# Patient Record
Sex: FEMALE | Race: WHITE | Employment: FULL TIME | URBAN - METROPOLITAN AREA
[De-identification: names, ages, dates, MRNs, and addresses within clinical notes are randomized per-mention and may not be internally consistent; named-entity substitution may affect disease eponyms.]

---

## 2020-05-05 ENCOUNTER — VIRTUAL VISIT (OUTPATIENT)
Dept: FAMILY MEDICINE CLINIC | Age: 30
End: 2020-05-05
Payer: COMMERCIAL

## 2020-05-05 ENCOUNTER — TELEPHONE (OUTPATIENT)
Dept: FAMILY MEDICINE CLINIC | Age: 30
End: 2020-05-05

## 2020-05-05 ENCOUNTER — TELEPHONE (OUTPATIENT)
Dept: CARDIOLOGY CLINIC | Age: 30
End: 2020-05-05

## 2020-05-05 VITALS — BODY MASS INDEX: 29.16 KG/M2 | WEIGHT: 175 LBS | HEIGHT: 65 IN

## 2020-05-05 PROCEDURE — 99203 OFFICE O/P NEW LOW 30 MIN: CPT | Performed by: FAMILY MEDICINE

## 2020-05-05 PROCEDURE — G8427 DOCREV CUR MEDS BY ELIG CLIN: HCPCS | Performed by: FAMILY MEDICINE

## 2020-05-05 RX ORDER — LULICONAZOLE 10 MG/G
1 CREAM TOPICAL 2 TIMES DAILY
Qty: 1 TUBE | Refills: 1 | Status: SHIPPED | OUTPATIENT
Start: 2020-05-05 | End: 2020-09-03

## 2020-05-05 RX ORDER — ALBUTEROL SULFATE 90 UG/1
2 AEROSOL, METERED RESPIRATORY (INHALATION) EVERY 6 HOURS PRN
COMMUNITY
Start: 2020-05-01

## 2020-05-05 RX ORDER — CYANOCOBALAMIN 1000 UG/ML
1000 INJECTION INTRAMUSCULAR; SUBCUTANEOUS
COMMUNITY
End: 2020-05-05 | Stop reason: SDUPTHER

## 2020-05-05 RX ORDER — CYANOCOBALAMIN 1000 UG/ML
1000 INJECTION INTRAMUSCULAR; SUBCUTANEOUS
Qty: 1 VIAL | Refills: 5 | Status: SHIPPED | OUTPATIENT
Start: 2020-05-05 | End: 2020-05-05 | Stop reason: SDUPTHER

## 2020-05-05 ASSESSMENT — PATIENT HEALTH QUESTIONNAIRE - PHQ9
SUM OF ALL RESPONSES TO PHQ QUESTIONS 1-9: 1
2. FEELING DOWN, DEPRESSED OR HOPELESS: 1
SUM OF ALL RESPONSES TO PHQ9 QUESTIONS 1 & 2: 1
1. LITTLE INTEREST OR PLEASURE IN DOING THINGS: 0
SUM OF ALL RESPONSES TO PHQ QUESTIONS 1-9: 1

## 2020-05-05 NOTE — TELEPHONE ENCOUNTER
Pt was referred to [facility]. She is a new patient to office. She will be seen for postural orthostatic tachycardia syndrome. Pt wants to do VV due to having a 7 mos old baby and no  aval. Can pt do VV/ please advise.

## 2020-05-05 NOTE — TELEPHONE ENCOUNTER
The patient has [employer]. Would you like us to tell her to call her insurance company to see what Neurologist are in her network?

## 2020-05-05 NOTE — PROGRESS NOTES
Vitals/Constitutional/EENT/Resp/CV/GI//MS/Neuro/Skin/Heme-Lymph-Imm. Pursuant to the emergency declaration under the [facility] [address] and the [employer] Act, this Virtual Visit was conducted with patient's (and/or legal guardian's) consent, to reduce the patient's risk of exposure to COVID-19 and provide necessary medical care. The patient (and/or legal guardian) has also been advised to contact this office for worsening conditions or problems, and seek emergency medical treatment and/or call 911 if deemed necessary. Patient initiated the encounter and gave consent for the encounter. Services were provided through a video synchronous discussion virtually to substitute for in-person clinic visit. Patient and provider were located at their individual homes. 20 min      Psychiatry:    [facility] ([id number])     Diagnosis Orders   1. POTS (postural orthostatic tachycardia syndrome)  QUYEN - Cecilia Sargent DO, Neurology [facility]   2. Small fiber neuropathy  [facility]   3. Vitamin B 12 deficiency  AFL - [facility]   4. [facility] Feng [facility]   5. Postmenopausal depression  External Referral to Psychiatry   6. Other mixed anxiety disorders  External Referral to Psychiatry   7. Eczema, unspecified type  Agustin Isidro MD, Dermatology, [facility] was seen today for establish care.     Diagnoses and all orders for this visit:    POTS (postural orthostatic tachycardia syndrome)  -     QUYEN - Cecilia Sargent DO, Neurology, Jossy Mary MD, Cadiology, [facility]    Small fiber neuropathy  -     QUYEN Sargent DO, [facility]    Vitamin B 12 deficiency  -     QUYEN - Cecilia Sargent DO, Neurology, [place] [facility]    Tinea

## 2020-05-06 ENCOUNTER — TELEPHONE (OUTPATIENT)
Dept: FAMILY MEDICINE CLINIC | Age: 30
End: 2020-05-06

## 2020-05-06 RX ORDER — CYANOCOBALAMIN 1000 UG/ML
1000 INJECTION INTRAMUSCULAR; SUBCUTANEOUS
Qty: 1 VIAL | Refills: 5 | Status: SHIPPED | OUTPATIENT
Start: 2020-05-06 | End: 2020-11-17 | Stop reason: SDUPTHER

## 2020-05-06 NOTE — TELEPHONE ENCOUNTER
Referral was put in for [facility] Neurology. I will get the contact information for that. I will work on getting contact information for all the referrals needed for the patient and [facility].

## 2020-05-07 NOTE — TELEPHONE ENCOUNTER
I do not know what the GI referral is for (the diagnosis) to order the referral, why does she want the referral to GI?

## 2020-05-19 ENCOUNTER — NURSE TRIAGE (OUTPATIENT)
Dept: OTHER | Facility: CLINIC | Age: 30
End: 2020-05-19

## 2020-05-19 ENCOUNTER — TELEPHONE (OUTPATIENT)
Dept: GASTROENTEROLOGY | Age: 30
End: 2020-05-19

## 2020-05-19 ENCOUNTER — VIRTUAL VISIT (OUTPATIENT)
Dept: GASTROENTEROLOGY | Age: 30
End: 2020-05-19
Payer: COMMERCIAL

## 2020-05-19 ENCOUNTER — VIRTUAL VISIT (OUTPATIENT)
Dept: FAMILY MEDICINE CLINIC | Age: 30
End: 2020-05-19
Payer: COMMERCIAL

## 2020-05-19 VITALS — HEIGHT: 65 IN | BODY MASS INDEX: 29.16 KG/M2 | WEIGHT: 175 LBS

## 2020-05-19 PROBLEM — K21.9 GASTROESOPHAGEAL REFLUX DISEASE: Status: ACTIVE | Noted: 2020-05-19

## 2020-05-19 PROBLEM — R10.30 LOWER ABDOMINAL PAIN: Status: ACTIVE | Noted: 2020-05-19

## 2020-05-19 PROBLEM — R19.7 DIARRHEA: Status: ACTIVE | Noted: 2020-05-19

## 2020-05-19 PROBLEM — E53.8 LOW VITAMIN B12 LEVEL: Status: ACTIVE | Noted: 2020-05-19

## 2020-05-19 PROBLEM — R79.89 LOW VITAMIN B12 LEVEL: Status: ACTIVE | Noted: 2020-05-19

## 2020-05-19 PROCEDURE — 1036F TOBACCO NON-USER: CPT | Performed by: INTERNAL MEDICINE

## 2020-05-19 PROCEDURE — 99203 OFFICE O/P NEW LOW 30 MIN: CPT | Performed by: INTERNAL MEDICINE

## 2020-05-19 PROCEDURE — 99213 OFFICE O/P EST LOW 20 MIN: CPT | Performed by: NURSE PRACTITIONER

## 2020-05-19 PROCEDURE — 1036F TOBACCO NON-USER: CPT | Performed by: NURSE PRACTITIONER

## 2020-05-19 PROCEDURE — G8419 CALC BMI OUT NRM PARAM NOF/U: HCPCS | Performed by: INTERNAL MEDICINE

## 2020-05-19 PROCEDURE — G8419 CALC BMI OUT NRM PARAM NOF/U: HCPCS | Performed by: NURSE PRACTITIONER

## 2020-05-19 PROCEDURE — G8428 CUR MEDS NOT DOCUMENT: HCPCS | Performed by: NURSE PRACTITIONER

## 2020-05-19 PROCEDURE — G8427 DOCREV CUR MEDS BY ELIG CLIN: HCPCS | Performed by: INTERNAL MEDICINE

## 2020-05-19 RX ORDER — CEPHALEXIN 500 MG/1
500 CAPSULE ORAL 2 TIMES DAILY
Qty: 14 CAPSULE | Refills: 0 | Status: SHIPPED | OUTPATIENT
Start: 2020-05-19 | End: 2020-05-26

## 2020-05-19 ASSESSMENT — ENCOUNTER SYMPTOMS
DIARRHEA: 0
VOMITING: 0
SHORTNESS OF BREATH: 0
NAUSEA: 0
COUGH: 0

## 2020-05-19 NOTE — PROGRESS NOTES
pressure-  Heart rate-    Respiratory rate-    Temperature-  Pulse oximetry-     Constitutional: [x] Appears well-developed and well-nourished [x] No apparent distress      [] Abnormal-   Mental status  [x] Alert and awake  [x] Oriented to person/place/time [x]Able to follow commands      Eyes:  EOM    []  Normal  [] Abnormal-  Sclera  []  Normal  [] Abnormal -         Discharge []  None visible  [] Abnormal -    HENT:   [] Normocephalic, atraumatic. [] Abnormal   [] Mouth/Throat: Mucous membranes are moist.     External Ears [] Normal  [] Abnormal-     Neck: [] No visualized mass     Pulmonary/Chest: [x] Respiratory effort normal.  [x] No visualized signs of difficulty breathing or respiratory distress        [] Abnormal-      Musculoskeletal:   [] Normal gait with no signs of ataxia         [] Normal range of motion of neck        [] Abnormal-       Neurological:        [] No Facial Asymmetry (Cranial nerve 7 motor function) (limited exam to video visit)          [] No gaze palsy        [] Abnormal-         Skin:        [] No significant exanthematous lesions or discoloration noted on facial skin         [] Abnormal-            Psychiatric:       [] Normal Affect [] No Hallucinations        [] Abnormal-     Other pertinent observable physical exam findings-the right great toe appears slightly erythematous and there is small amount of yellow drainage noted. There is a small wedges missing from in the inner distal portion of nail bed. ASSESSMENT/PLAN:  1. Infection of toenail  - cephALEXin (KEFLEX) 500 MG capsule; Take 1 capsule by mouth 2 times daily for 7 days  Dispense: 14 capsule; Refill: 0    Will put her on a few days of keflex due to the oozing  Keep clean and dry  Follow up with podiatry if symptoms worsen. No follow-ups on file. Martha García is a 31 y.o. female being evaluated by a Virtual Visit (video visit) encounter to address concerns as mentioned above.   A caregiver was present when appropriate. Due to this being a TeleHealth encounter (During LTZNN-38 public health emergency), evaluation of the following organ systems was limited: Vitals/Constitutional/EENT/Resp/CV/GI//MS/Neuro/Skin/Heme-Lymph-Imm. Pursuant to the emergency declaration under the [address] [place] [address], [address] [place] [address] and the [employer] and [employer] Act, this Virtual Visit was conducted with patient's (and/or legal guardian's) consent, to reduce the patient's risk of exposure to COVID-19 and provide necessary medical care. The patient (and/or legal guardian) has also been advised to contact this office for worsening conditions or problems, and seek emergency medical treatment and/or call 911 if deemed necessary. Patient identification was verified at the start of the visit: Yes    Total time spent on this encounter: 15 mins    Services were provided through a video synchronous discussion virtually to substitute for in-person clinic visit. Patient and provider were located at their individual homes. --MAURILIO Haro CNP on 5/19/2020 at 3:04 PM    An electronic signature was used to authenticate this note.

## 2020-05-19 NOTE — TELEPHONE ENCOUNTER
Reason for Disposition   Looks infected (e.g., spreading redness, pus, red streak)   Black (necrotic) or blisters develop in wound    Answer Assessment - Initial Assessment Questions  1. MECHANISM: \"How did the injury happen? \"       \"I stubbed my toe on the edge of the crib and it caught my toe nail\"    2. ONSET: \"When did the injury happen? \" (Minutes or hours ago)       \"Sunday\"    3. LOCATION: \"What part of the toe is injured? \" \"Is the nail damaged? \"       \"Right foot, big toe, toe nail is completely bruised. You can see the nail bed from the tip of the toe to the nail base. The skin at the top is bruised too. \"    4. APPEARANCE of TOE INJURY: \"What does the injury look like? \"      See above    5. SEVERITY: \"Can you use the foot normally? \" \"Can you walk? \"       \"Yes I can walk but it's pretty uncomfortably. I have it bandaged up. \"    6. SIZE: For cuts, bruises, or swelling, ask: \"How large is it? \" (e.g., inches or centimeters;  entire toe)       \"A little swelling or bruising at the top of the toe where the nail goes in.\"    7. PAIN: \"Is there pain? \" If so, ask: \"How bad is the pain? \"   (e.g., Scale 1-10; or mild, moderate, severe)      \"6 or 7/10\"    8. TETANUS: For any breaks in the skin, ask: \"When was the last tetanus booster? \"      \"It was wood and I had a sock on when it happened. I don't know when my last tetanus shot. \"    9. DIABETES: \"Do you have a history of diabetes or poor circulation in the feet? \"      \"No, but I do have small fiber neuropathy. \"    10. OTHER SYMPTOMS: \"Do you have any other symptoms? \"         \"No\"    11. PREGNANCY: \"Is there any chance you are pregnant? \" \"When was your last menstrual period? \"        \"No\"    Protocols used: TOE INJURY-ADULT-[place], WOUND INFECTION-ADULT-[place]    Spoke with [place] in [facility], who transferred me to Dr. Amena Meredith office. Spoke with Sera Sauceda at the office, report given, patient transferred to set up appointment with provider this afternoon.     Alda Primer

## 2020-05-19 NOTE — PROGRESS NOTES
call.    PAST MEDICAL HISTORY     Past Medical History:   Diagnosis Date    Endometriosis 2014     FAMILY HISTORY     Family History   Problem Relation Age of Onset    Breast Cancer Maternal Grandmother     Heart Attack Paternal Grandfather     Hypertension Paternal Grandfather     Diabetes Paternal Grandfather      SOCIAL HISTORY     Social History     Socioeconomic History    Marital status: Single     Spouse name: Not on file    Number of children: Not on file    Years of education: Not on file    Highest education level: Not on file   Occupational History    Not on file   Social Needs    Financial resource strain: Not on file    Food insecurity     Worry: Not on file     Inability: Not on file    Transportation needs     Medical: Not on file     Non-medical: Not on file   Tobacco Use    Smoking status: Never Smoker    Smokeless tobacco: Never Used   Substance and Sexual Activity    Alcohol use: Not Currently    Drug use: Never    Sexual activity: Not Currently     Birth control/protection: Other-see comments   Lifestyle    Physical activity     Days per week: Not on file     Minutes per session: Not on file    Stress: Not on file   Relationships    Social connections     Talks on phone: Not on file     Gets together: Not on file     Attends [employer] service: Not on file     Active member of club or organization: Not on file     Attends meetings of clubs or organizations: Not on file     Relationship status: Not on file    Intimate partner violence     Fear of current or ex partner: Not on file     Emotionally abused: Not on file     Physically abused: Not on file     Forced sexual activity: Not on file   Other Topics Concern    Not on file   Social History Narrative    Not on file     SURGICAL HISTORY     Past Surgical History:   Procedure Laterality Date    BREAST CYST EXCISION Right 2017     SECTION  2019    Uterine extension    NASAL SEPTUM SURGERY  2018    Last 3 Encounters:   05/19/20 175 lb (79.4 kg)   05/05/20 175 lb (79.4 kg)       Exam done through audiovideo visit and is limited  Constitutional: AAO3, No acute distress  HEENT: no pallor or icterus. Neck: no visible masses  Respiratory: normal effort, no visualized signs of respiratory distress  Psych: normal affect, no hallucinations    FINAL IMPRESSION     Low B12 levels. This is based on patient reported history. Cause is not clear. Says she was tested with antibodies for pernicious anemia and was told this was negative. She also reports chronic mild diarrhea (3 bms a day), lower abdominal cramping  - check B12 and folate levels (she is on replacement now)  - check routine labs including albumin/protein levels, CBC, celiac serology  - check stool studies including fecal calprotectin (r/o inflammatory bowel disease), Giardia, fecal elastase given her diarrhea. - EGD to rule out atrophic gastritis  Further recommendations based on above results. Time of visit: 30 minutes  Physician location: physician office  Patient location: patients home. Pursuant to the emergency declaration under the [facility] waiver authority and the [employer] and [employer] Act, this Virtual  Visit was conducted, with patient's consent, to reduce the patient's risk of exposure to COVID-19 and provide continuity of care for an established patient. Services were provided through a video synchronous discussion virtually to substitute for in-person clinic visit.

## 2020-05-20 ENCOUNTER — TELEPHONE (OUTPATIENT)
Dept: ADMINISTRATIVE | Age: 30
End: 2020-05-20

## 2020-05-26 ENCOUNTER — VIRTUAL VISIT (OUTPATIENT)
Dept: CARDIOLOGY CLINIC | Age: 30
End: 2020-05-26
Payer: COMMERCIAL

## 2020-05-26 VITALS — HEIGHT: 65 IN | WEIGHT: 175 LBS | BODY MASS INDEX: 29.16 KG/M2

## 2020-05-26 PROBLEM — R60.9 SWELLING: Status: ACTIVE | Noted: 2020-05-26

## 2020-05-26 PROBLEM — G90.A POTS (POSTURAL ORTHOSTATIC TACHYCARDIA SYNDROME): Status: ACTIVE | Noted: 2020-05-26

## 2020-05-26 PROBLEM — R06.02 SOB (SHORTNESS OF BREATH): Status: ACTIVE | Noted: 2020-05-26

## 2020-05-26 PROCEDURE — G8427 DOCREV CUR MEDS BY ELIG CLIN: HCPCS | Performed by: INTERNAL MEDICINE

## 2020-05-26 PROCEDURE — 99244 OFF/OP CNSLTJ NEW/EST MOD 40: CPT | Performed by: INTERNAL MEDICINE

## 2020-05-26 PROCEDURE — G8419 CALC BMI OUT NRM PARAM NOF/U: HCPCS | Performed by: INTERNAL MEDICINE

## 2020-05-26 RX ORDER — PREDNISONE 10 MG/1
10 TABLET ORAL 2 TIMES DAILY
COMMUNITY
Start: 2020-05-24 | End: 2020-05-28

## 2020-05-26 NOTE — PROGRESS NOTES
legs and her arms. She is post pregnancy and so I am concerned for post partum cardiomyopathy. I would like to start with an ultrasound and some lab work to see/make sure no other potential physiology occurring that may explain her symptoms. After her ultrasound and lab work we will consider management of her pots which will include increase fluid intake, increase salt intake, and possibly augmentation with medication such as fludrocortisone or midodrine. We will follow-up with her for her labs. - Echocardiogram.  - CMP and CBC.  - Follow up in 3 weeks. 2. Shortness of breath. 05/26/2020  Etiology unclear. Occurs with exertion. May be sinus tachycardia, inappropriate sinus tachycardia, or postural orthostatic tachycardia. I suspect it is most likely related to deconditioning, however we will work-up given the severity of her symptoms. 3. Swelling. 05/26/2020  Etiology unclear. May be related to prednisone however she states that temporarily it started well before she got started on her prednisone. Plan as per above. RECOMMENDATION:  1. Echocardiogram to view size and strength of the heart   2. LABS - LFT's and CBC   3. Once testing is complete, will decide a treatment plan  4. Follow up in 3 weeks     Lia Moyer is a 31 y.o. female being evaluated by a Virtual Visit (video visit) encounter to address concerns as mentioned above. A caregiver was present when appropriate. Due to this being a TeleHealth encounter (During WWFBY-05 public health emergency), evaluation of the following organ systems was limited: Vitals/Constitutional/EENT/Resp/CV/GI//MS/Neuro/Skin/Heme-Lymph-Imm.   Pursuant to the emergency declaration under the [address] [place] [address], [address] [place] [address] authority and the "[employer] and [employer] Act, this Virtual Visit was conducted with patient's (and/or legal guardian's) consent, to reduce the patient's risk of exposure

## 2020-05-28 ENCOUNTER — OFFICE VISIT (OUTPATIENT)
Dept: PRIMARY CARE CLINIC | Age: 30
End: 2020-05-28

## 2020-05-29 NOTE — PROGRESS NOTES
Obstructive Sleep Apnea (RADHA) Screening     Patient:  Sloane Jcaobson    YOB: 1990      Medical Record #:  [id number]                     Date:  5/29/2020     1. Are you a loud and/or regular snorer? []  Yes       [x] No    2. Have you been observed to gasp or stop breathing during sleep? [x]  Yes       [] No    3. Do you feel tired or groggy upon awakening or do you awaken with a headache? [x]  Yes       [] No    4. Are you often tired or fatigued during the wake time hours? [x]  Yes       [] No    5. Do you fall asleep sitting, reading, watching TV or driving? [x]  Yes       [] No    6. Do you often have problems with memory or concentration? [x]  Yes       [] No    **If patient's score is ? 3 they are considered high risk for RADHA. An Anesthesia provider will evaluate the patient and develop a plan of care the day of surgery. Note:  If the patient's BMI is more than 35 kg m¯² , has neck circumference > 40 cm, and/or high blood pressure the risk is greater (© American Sleep Apnea Association, 2006).

## 2020-06-02 LAB — SARS-COV-2: NOT DETECTED

## 2020-06-03 ENCOUNTER — ANESTHESIA EVENT (OUTPATIENT)
Dept: ENDOSCOPY | Age: 30
End: 2020-06-03
Payer: COMMERCIAL

## 2020-06-04 ENCOUNTER — ANESTHESIA (OUTPATIENT)
Dept: ENDOSCOPY | Age: 30
End: 2020-06-04
Payer: COMMERCIAL

## 2020-06-04 ENCOUNTER — HOSPITAL ENCOUNTER (OUTPATIENT)
Age: 30
Setting detail: OUTPATIENT SURGERY
Discharge: HOME | End: 2020-06-04
Attending: INTERNAL MEDICINE | Admitting: INTERNAL MEDICINE
Payer: COMMERCIAL

## 2020-06-04 VITALS
DIASTOLIC BLOOD PRESSURE: 85 MMHG | HEIGHT: 65 IN | TEMPERATURE: 97.7 F | HEART RATE: 70 BPM | OXYGEN SATURATION: 100 % | WEIGHT: 175 LBS | SYSTOLIC BLOOD PRESSURE: 137 MMHG | RESPIRATION RATE: 16 BRPM | BODY MASS INDEX: 29.16 KG/M2

## 2020-06-04 VITALS — SYSTOLIC BLOOD PRESSURE: 119 MMHG | DIASTOLIC BLOOD PRESSURE: 79 MMHG | OXYGEN SATURATION: 100 %

## 2020-06-04 LAB — PREGNANCY, URINE: NEGATIVE

## 2020-06-04 PROCEDURE — 43239 EGD BIOPSY SINGLE/MULTIPLE: CPT | Performed by: INTERNAL MEDICINE

## 2020-06-04 PROCEDURE — 3609012400 HC EGD TRANSORAL BIOPSY SINGLE/MULTIPLE: Performed by: INTERNAL MEDICINE

## 2020-06-04 PROCEDURE — 7100000010 HC PHASE II RECOVERY - FIRST 15 MIN: Performed by: INTERNAL MEDICINE

## 2020-06-04 PROCEDURE — 2580000003 HC RX 258: Performed by: INTERNAL MEDICINE

## 2020-06-04 PROCEDURE — 2500000003 HC RX 250 WO HCPCS: Performed by: NURSE ANESTHETIST, CERTIFIED REGISTERED

## 2020-06-04 PROCEDURE — 2709999900 HC NON-CHARGEABLE SUPPLY: Performed by: INTERNAL MEDICINE

## 2020-06-04 PROCEDURE — 88305 TISSUE EXAM BY PATHOLOGIST: CPT

## 2020-06-04 PROCEDURE — 3700000001 HC ADD 15 MINUTES (ANESTHESIA): Performed by: INTERNAL MEDICINE

## 2020-06-04 PROCEDURE — 6360000002 HC RX W HCPCS: Performed by: NURSE ANESTHETIST, CERTIFIED REGISTERED

## 2020-06-04 PROCEDURE — 2500000003 HC RX 250 WO HCPCS: Performed by: ANESTHESIOLOGY

## 2020-06-04 PROCEDURE — 2580000003 HC RX 258: Performed by: ANESTHESIOLOGY

## 2020-06-04 PROCEDURE — 84703 CHORIONIC GONADOTROPIN ASSAY: CPT

## 2020-06-04 PROCEDURE — 7100000011 HC PHASE II RECOVERY - ADDTL 15 MIN: Performed by: INTERNAL MEDICINE

## 2020-06-04 PROCEDURE — 3700000000 HC ANESTHESIA ATTENDED CARE: Performed by: INTERNAL MEDICINE

## 2020-06-04 RX ORDER — SODIUM CHLORIDE, SODIUM LACTATE, POTASSIUM CHLORIDE, CALCIUM CHLORIDE 600; 310; 30; 20 MG/100ML; MG/100ML; MG/100ML; MG/100ML
INJECTION, SOLUTION INTRAVENOUS ONCE
Status: COMPLETED | OUTPATIENT
Start: 2020-06-04 | End: 2020-06-04

## 2020-06-04 RX ORDER — ONDANSETRON 2 MG/ML
4 INJECTION INTRAMUSCULAR; INTRAVENOUS
Status: DISCONTINUED | OUTPATIENT
Start: 2020-06-04 | End: 2020-06-04 | Stop reason: HOSPADM

## 2020-06-04 RX ORDER — MORPHINE SULFATE 2 MG/ML
1 INJECTION, SOLUTION INTRAMUSCULAR; INTRAVENOUS EVERY 5 MIN PRN
Status: DISCONTINUED | OUTPATIENT
Start: 2020-06-04 | End: 2020-06-04 | Stop reason: HOSPADM

## 2020-06-04 RX ORDER — SODIUM CHLORIDE, SODIUM LACTATE, POTASSIUM CHLORIDE, CALCIUM CHLORIDE 600; 310; 30; 20 MG/100ML; MG/100ML; MG/100ML; MG/100ML
INJECTION, SOLUTION INTRAVENOUS CONTINUOUS
Status: DISCONTINUED | OUTPATIENT
Start: 2020-06-04 | End: 2020-06-04 | Stop reason: HOSPADM

## 2020-06-04 RX ORDER — LIDOCAINE HYDROCHLORIDE 10 MG/ML
1 INJECTION, SOLUTION EPIDURAL; INFILTRATION; INTRACAUDAL; PERINEURAL
Status: DISCONTINUED | OUTPATIENT
Start: 2020-06-04 | End: 2020-06-04 | Stop reason: HOSPADM

## 2020-06-04 RX ORDER — LIDOCAINE HYDROCHLORIDE 20 MG/ML
INJECTION, SOLUTION INFILTRATION; PERINEURAL PRN
Status: DISCONTINUED | OUTPATIENT
Start: 2020-06-04 | End: 2020-06-04 | Stop reason: SDUPTHER

## 2020-06-04 RX ORDER — PROPOFOL 10 MG/ML
INJECTION, EMULSION INTRAVENOUS PRN
Status: DISCONTINUED | OUTPATIENT
Start: 2020-06-04 | End: 2020-06-04 | Stop reason: SDUPTHER

## 2020-06-04 RX ORDER — SODIUM CHLORIDE 0.9 % (FLUSH) 0.9 %
10 SYRINGE (ML) INJECTION PRN
Status: DISCONTINUED | OUTPATIENT
Start: 2020-06-04 | End: 2020-06-04 | Stop reason: HOSPADM

## 2020-06-04 RX ORDER — MORPHINE SULFATE 2 MG/ML
2 INJECTION, SOLUTION INTRAMUSCULAR; INTRAVENOUS EVERY 5 MIN PRN
Status: DISCONTINUED | OUTPATIENT
Start: 2020-06-04 | End: 2020-06-04 | Stop reason: HOSPADM

## 2020-06-04 RX ORDER — OXYCODONE HYDROCHLORIDE AND ACETAMINOPHEN 5; 325 MG/1; MG/1
2 TABLET ORAL PRN
Status: DISCONTINUED | OUTPATIENT
Start: 2020-06-04 | End: 2020-06-04 | Stop reason: HOSPADM

## 2020-06-04 RX ORDER — SODIUM CHLORIDE 0.9 % (FLUSH) 0.9 %
10 SYRINGE (ML) INJECTION EVERY 12 HOURS SCHEDULED
Status: DISCONTINUED | OUTPATIENT
Start: 2020-06-04 | End: 2020-06-04 | Stop reason: HOSPADM

## 2020-06-04 RX ORDER — OXYCODONE HYDROCHLORIDE AND ACETAMINOPHEN 5; 325 MG/1; MG/1
1 TABLET ORAL PRN
Status: DISCONTINUED | OUTPATIENT
Start: 2020-06-04 | End: 2020-06-04 | Stop reason: HOSPADM

## 2020-06-04 RX ADMIN — PROPOFOL 40 MG: 10 INJECTION, EMULSION INTRAVENOUS at 10:11

## 2020-06-04 RX ADMIN — FAMOTIDINE 20 MG: 10 INJECTION, SOLUTION INTRAVENOUS at 09:53

## 2020-06-04 RX ADMIN — SODIUM CHLORIDE, POTASSIUM CHLORIDE, SODIUM LACTATE AND CALCIUM CHLORIDE: 600; 310; 30; 20 INJECTION, SOLUTION INTRAVENOUS at 09:51

## 2020-06-04 RX ADMIN — SODIUM CHLORIDE, POTASSIUM CHLORIDE, SODIUM LACTATE AND CALCIUM CHLORIDE: 600; 310; 30; 20 INJECTION, SOLUTION INTRAVENOUS at 09:52

## 2020-06-04 RX ADMIN — PROPOFOL 30 MG: 10 INJECTION, EMULSION INTRAVENOUS at 10:06

## 2020-06-04 RX ADMIN — PROPOFOL 30 MG: 10 INJECTION, EMULSION INTRAVENOUS at 10:07

## 2020-06-04 RX ADMIN — PROPOFOL 30 MG: 10 INJECTION, EMULSION INTRAVENOUS at 10:13

## 2020-06-04 RX ADMIN — LIDOCAINE HYDROCHLORIDE 100 MG: 20 INJECTION, SOLUTION INFILTRATION; PERINEURAL at 10:01

## 2020-06-04 RX ADMIN — PROPOFOL 70 MG: 10 INJECTION, EMULSION INTRAVENOUS at 10:05

## 2020-06-04 ASSESSMENT — LIFESTYLE VARIABLES: SMOKING_STATUS: 0

## 2020-06-04 ASSESSMENT — ENCOUNTER SYMPTOMS: SHORTNESS OF BREATH: 1

## 2020-06-04 NOTE — H&P
Chief Complaint   Patient presents with    New Patient       hx of hemorrhoids, vitamin b12 deficiency, ibs w/ diarrhea, stomach cramps            HPI:     32 YO F here for low vitamin B12 levels, IBS, diarrhea, abdominal cramping. She says she was diagnosed with small fiber neuropathy and B12 deficiency in [place], she gets B12 injections. She also says she has a diagnosis of POTS syndrome. PMH of anxiety, depression. She says she was tested negative for pernicious anemia in the past, she tells me she saw a specialist at [facility] in [place] to get an extensive workup there and was told she did not have pernicious anemia. She has not seen a GI prior and has not had a EGD before. Reports a longstanding GERD history and used to be on PPI for several years starting age 11 but not on it anymore and this is not a major issue currently. Has had several years of mild diarrhea, was diagnosed with IBS in the past. She has intermittent lower abdominal cramping and this is better with bowel movements. She has 3 bms on average daily and these are loose. She has not lost any weight unintentionally. She denies any blood in the stools. She was off the B12 injections for about 6 months and restarted them recently with PCP. No recent B12 level available. No prior records are currently available. Reviewed labs available in [facility] from [address] [place] [address]  CBC normal 3/17/2020  She has a 9 month old infant. She says she is not breastfeeding. Most of the visit was completed as a audio-video visit but there were frequent connection issues and disconnects.  The last 5 minutes of the visit were conducted through a telephone call.     PAST MEDICAL HISTORY      Past Medical History        Past Medical History:   Diagnosis Date    Endometriosis 09/2014         FAMILY HISTORY      Family History         Family History   Problem Relation Age of Onset    Breast Cancer Maternal Grandmother      Heart Attack Paternal Grandfather   medications prescribed during this encounter as epic can not insert only the list prior to this encounter.)  Current Outpatient Rx          Current Outpatient Rx   Medication Sig Dispense Refill    cephALEXin (KEFLEX) 500 MG capsule Take 1 capsule by mouth 2 times daily for 7 days 14 capsule 0    cyanocobalamin 1000 MCG/ML injection Inject 1 mL into the muscle every 30 days 1 vial 5    albuterol sulfate  (90 Base) MCG/ACT inhaler Inhale 2 puffs into the lungs every 6 hours as needed        luliconazole (LUZU) 1 % CREA cream Apply 1 Film topically 2 times daily 1 Tube 1    Syringe/Needle, Disp, (SYRINGE 3CC/25GX5/8\") 25G X 5/8\" 3 ML MISC Use as directed to administer B12 once a month 1 each 11         ALLERGIES   No Known Allergies  IMMUNIZATIONS      There is no immunization history on file for this patient. REVIEW OF SYSTEMS      Constitutional: denies fever and unexpected weight change. HENT: Negative for ear pain, hearing loss and nosebleeds. Eyes: Negative for pain and visual disturbance. Respiratory: Negative for cough, shortness of breath and wheezing. Cardiovascular: Negative for chest pain, palpitations and leg swelling. Gastrointestinal: see HPI for details. Endocrine: Negative for polydipsia, polyphagia and polyuria. Genitourinary: Negative for difficulty urinating, dysuria, hematuria and urgency. Musculoskeletal: Positive for arthralgias and back pain. Skin: Negative for pallor and rash. Allergic/Immunologic: Negative for environmental allergies and immunocompromised state. Neurological: Negative for seizures, syncope. Hematological: Negative for adenopathy. Does not bruise/bleed easily.    Psychiatric/Behavioral: Negative for agitation, confusion, hallucinations.     PHYSICAL EXAM   Ht 5' 5\" (1.651 m)   Wt 175 lb (79.4 kg)   BMI 29.12 kg/m²       Wt Readings from Last 3 Encounters:   05/19/20 175 lb (79.4 kg)   05/05/20 175 lb (79.4 kg)         Exam done through

## 2020-06-04 NOTE — ANESTHESIA POSTPROCEDURE EVALUATION
Department of Anesthesiology  Postprocedure Note    Patient: Berneice Severs  MRN: [id number]  YOB: 1990  Date of evaluation: 6/4/2020  Time:  10:58 AM     Procedure Summary     Date:  06/04/20 Room / Location:  [facility] [place] [address] / [facility]    Anesthesia Start:  1001 Anesthesia Stop:  1952    Procedure:  EGD BIOPSY (N/A ) Diagnosis:  (GASTROESOPHAGEAL REFLUX DISEASE, ESOPHAGITIS PRESENCE NOT SPECIFIED)    Surgeon:  Bianca Lopes MD Responsible Provider:  Josh Seo MD    Anesthesia Type:  TIVA ASA Status:  3          Anesthesia Type: TIVA    Dony Phase I: Dony Score: 10    Dony Phase II: Dony Score: 10    Last vitals: Reviewed and per EMR flowsheets.      Vitals:    06/04/20 0946 06/04/20 1023 06/04/20 1033 06/04/20 1039   BP: 124/83 117/82 (!) 124/93 137/85   Pulse: 77 77 70 70   Resp: 16 16 16 16   Temp: 97 °F (36.1 °C) 97.7 °F (36.5 °C)     TempSrc:  Temporal     SpO2: 99% 100% 100% 100%   Weight: 175 lb (79.4 kg)      Height: 5' 5\" (1.651 m)        Anesthesia Post Evaluation    Patient location during evaluation: bedside  Patient participation: complete - patient participated  Level of consciousness: awake and alert  Airway patency: patent  Nausea & Vomiting: no nausea  Complications: no  Cardiovascular status: hemodynamically stable  Respiratory status: acceptable  Hydration status: euvolemic

## 2020-06-10 ENCOUNTER — HOSPITAL ENCOUNTER (OUTPATIENT)
Dept: NON INVASIVE DIAGNOSTICS | Age: 30
Discharge: HOME | End: 2020-06-10
Payer: COMMERCIAL

## 2020-06-10 LAB
LV EF: 55 %
LVEF MODALITY: NORMAL

## 2020-06-10 PROCEDURE — 93306 TTE W/DOPPLER COMPLETE: CPT

## 2020-06-12 ENCOUNTER — TELEPHONE (OUTPATIENT)
Dept: CARDIOLOGY CLINIC | Age: 30
End: 2020-06-12

## 2020-06-15 ENCOUNTER — TELEPHONE (OUTPATIENT)
Dept: FAMILY MEDICINE CLINIC | Age: 30
End: 2020-06-15

## 2020-06-15 NOTE — TELEPHONE ENCOUNTER
Destinee Grant called stating that the Derm Doctor can not get her in and she would like to get a referral to Dr Russel Rodriguez    Ph: [phone]  Fax: [phone]  Please call the pt when the Referral is completed

## 2020-06-16 NOTE — TELEPHONE ENCOUNTER
[facility] received a call from: Patient    Name of Caller: Dionna    Relationship to patient: Self    Organization name: na    Best contact number: [phone]    Reason for call: Pt states her hands are literally cracking and bleeding and it hurts. Pt is waiting on the referral for dermatology. Pt states she has a 21 month old and is always changing diapers and washing her hands and is afraid that she may get a infection being that she has open wounds. Please call pt when referral is ready.

## 2020-07-01 NOTE — PROGRESS NOTES
Date End Date Taking? Authorizing Provider   cyanocobalamin 1000 MCG/ML injection Inject 1 mL into the muscle every 30 days 5/6/20  Yes Donnie Ott DO   albuterol sulfate  (90 Base) MCG/ACT inhaler Inhale 2 puffs into the lungs every 6 hours as needed 5/1/20  Yes Historical Provider, MD   Topiramate (TOPAMAX PO) Take by mouth    Historical Provider, MD   Norgestimate-Eth Estradiol (SPRINTEC 28 PO) Take by mouth daily    Historical Provider, MD   luliconazole (LUZU) 1 % CREA cream Apply 1 Film topically 2 times daily  Patient not taking: Reported on 7/2/2020 5/5/20   Donnie Ott DO   Syringe/Needle, Disp, (SYRINGE 3CC/25GX5/8\") 25G X 5/8\" 3 ML MISC Use as directed to administer B12 once a month 5/5/20   Donnie Ott DO       REVIEW OF SYSTEMS:      All 14-point review of systems are completed and pertinent positives are mentioned in the history of present illness. Other systems are reviewed and are negative. Physical Examination:    BP (!) 129/91 (Position: Standing)   Pulse 70   Ht 5' 5\" (1.651 m)   Wt 171 lb 8 oz (77.8 kg)   SpO2 99%   BMI 28.54 kg/m²    Constitutional and General Appearance: alert, cooperative, no distress and appears stated age  HEENT: PERRL, no cervical lymphadenopathy. No masses palpable. Normal oral mucosa  Respiratory:  · Normal excursion and expansion without use of accessory muscles  · Resp Auscultation: Normal breath sounds without dullness or wheezing  Cardiovascular:  · The apical impulse is not displaced  · Heart tones are crisp and normal. regular S1 and S2. Abdomen:  · No masses or tenderness  · Bowel sounds present  Extremities:  ·  No Cyanosis or Clubbing  ·  Lower extremity edema: No  · Skin: Warm and dry  Neurological:  · Alert and oriented. · Moves all extremities well  · No abnormalities of mood, affect, memory, mentation, or behavior are noted    DATA:    ECG:  SR 64 bpm  ECHO: 6/10/2020   Summary   Normal left ventricular size and wall thickness.  Normal temporarily it started well before she got started on her prednisone. Plan as per above.     RECOMMENDATIONS:  1. Call me after [facility] is restored and let me know how you are doing  2. Follow up in 6-8 weeks  3. Letter for [facility]    QUALITY MEASURES  1. Tobacco Cessation Counseling: NA  2. Retake of BP if >140/90:   NA  3. Documentation to PCP/referring for new patient:  Sent to PCP at close of office visit  4. CAD patient on anti-platelet: NA  5. CAD patient on STATIN therapy:  NA  6. Patient with CHF and aFib on anticoagulation:  NA     All questions and concerns were addressed to the patient/family. Alternatives to my treatment were discussed. Dr. Kwaku Frazier MD  Electrophysiology  [facility]. [address] [place] [address]. [address] [place] [address]  Phone: [phone]  Fax: [phone]   7/2/2020     NOTE: This report was transcribed using voice recognition software. Every effort was made to ensure accuracy, however, inadvertent computerized transcription errors may be present. This note was scribed in the presence of Kwaku Frazier MD by Azra Crook RN. The scribe's documentation has been prepared under my direction and personally reviewed by me in its entirety. I confirm that the note above accurately reflects all work, physical examination, the discussion of treatments and procedures, and medical decision making performed by me. Denise Allen MD personally performed the services described in this documentation as scribed by nurse in my presence, and is both accurate and complete.     Electronically signed by Sharon Gillis MD on 7/11/2020 at 10:06 AM

## 2020-07-02 ENCOUNTER — OFFICE VISIT (OUTPATIENT)
Dept: CARDIOLOGY CLINIC | Age: 30
End: 2020-07-02
Payer: COMMERCIAL

## 2020-07-02 VITALS
HEIGHT: 65 IN | WEIGHT: 171.5 LBS | HEART RATE: 70 BPM | BODY MASS INDEX: 28.57 KG/M2 | SYSTOLIC BLOOD PRESSURE: 129 MMHG | OXYGEN SATURATION: 99 % | DIASTOLIC BLOOD PRESSURE: 91 MMHG

## 2020-07-02 PROCEDURE — G8419 CALC BMI OUT NRM PARAM NOF/U: HCPCS | Performed by: INTERNAL MEDICINE

## 2020-07-02 PROCEDURE — 93000 ELECTROCARDIOGRAM COMPLETE: CPT | Performed by: INTERNAL MEDICINE

## 2020-07-02 PROCEDURE — G8427 DOCREV CUR MEDS BY ELIG CLIN: HCPCS | Performed by: INTERNAL MEDICINE

## 2020-07-02 PROCEDURE — 99214 OFFICE O/P EST MOD 30 MIN: CPT | Performed by: INTERNAL MEDICINE

## 2020-07-02 PROCEDURE — 1036F TOBACCO NON-USER: CPT | Performed by: INTERNAL MEDICINE

## 2020-07-02 NOTE — LETTER
[facility] - [address] [facility] [address] [facility]  Phone: [phone]  Fax: [phone]    Surendra López MD        July 2, 2020    [address]      Dear Humble Wilson:    [facility] 1990 has medical conditions that require her to have working [facility]. Heat would exacerbate her symptoms. If you have any questions or concerns, please don't hesitate to call.     Sincerely,        Surendra López MD

## 2020-07-02 NOTE — PATIENT INSTRUCTIONS
RECOMMENDATIONS:  1. Call me after [facility] is restored and let me know how you are doing  2. Follow up in 6-8 weeks  3.   Letter for [facility]

## 2020-08-05 ENCOUNTER — VIRTUAL VISIT (OUTPATIENT)
Dept: FAMILY MEDICINE CLINIC | Age: 30
End: 2020-08-05
Payer: COMMERCIAL

## 2020-08-05 ENCOUNTER — NURSE TRIAGE (OUTPATIENT)
Dept: OTHER | Facility: CLINIC | Age: 30
End: 2020-08-05

## 2020-08-05 PROCEDURE — G8428 CUR MEDS NOT DOCUMENT: HCPCS | Performed by: FAMILY MEDICINE

## 2020-08-05 PROCEDURE — 99214 OFFICE O/P EST MOD 30 MIN: CPT | Performed by: FAMILY MEDICINE

## 2020-08-05 RX ORDER — FLUTICASONE PROPIONATE 50 MCG
1 SPRAY, SUSPENSION (ML) NASAL DAILY
Qty: 2 BOTTLE | Refills: 1 | Status: SHIPPED | OUTPATIENT
Start: 2020-08-05 | End: 2021-03-03 | Stop reason: ALTCHOICE

## 2020-08-05 RX ORDER — AZITHROMYCIN 250 MG/1
TABLET, FILM COATED ORAL
Qty: 1 PACKET | Refills: 0 | Status: SHIPPED | OUTPATIENT
Start: 2020-08-05 | End: 2020-08-15

## 2020-08-05 NOTE — PROGRESS NOTES
TELEHEALTH EVALUATION -- Audio/Visual (During RSMJU-09 public health emergency)    HPI:  The patient has requested an audio/video evaluation for the following concern(s):  Chief complaint   Chief Complaint   Patient presents with    Other     mold exposure    Sinus Problem     sinus congestion. This is new. Is been going on for the past couple of weeks. It began when there was moisture and mold noted due to air conditioning problem, around the beginning of July. .  Is associated with runny nose, sinus pressure in the face, occasional cough, no fever no chills. No wheezing. Watery eyes  She has been exposed to mold in her air intake system. Her son has similar symptoms to this. She has a past medical history of mold sensitivity and this feels similar to that episode. She has been tested for mold in the past and although she does not have a specific allergy to mold mold exacerbates her symptoms and it has been so bad in the past that she needed sinus surgery when she lived in [place]. Her symptoms have been mostly under control since them but recently have exacerbated.   Past Medical History:   Diagnosis Date    Acid reflux     Endometriosis 2014    Hypertension     POTS (postural orthostatic tachycardia syndrome)     Small fiber neuropathy      Social History     Socioeconomic History    Marital status: Single     Spouse name: Not on file    Number of children: Not on file    Years of education: Not on file    Highest education level: Not on file   Occupational History    Not on file   Social Needs    Financial resource strain: Not on file    Food insecurity     Worry: Not on file     Inability: Not on file    Transportation needs     Medical: Not on file     Non-medical: Not on file   Tobacco Use    Smoking status: Former Smoker     Last attempt to quit: 2014     Years since quittin.1    Smokeless tobacco: Never Used   Substance and Sexual Activity    Alcohol use: Not Currently    Drug use: Never    Sexual activity: Not Currently     Birth control/protection: Other-see comments   Lifestyle    Physical activity     Days per week: Not on file     Minutes per session: Not on file    Stress: Not on file   Relationships    Social connections     Talks on phone: Not on file     Gets together: Not on file     Attends Bahai service: Not on file     Active member of club or organization: Not on file     Attends meetings of clubs or organizations: Not on file     Relationship status: Not on file    Intimate partner violence     Fear of current or ex partner: Not on file     Emotionally abused: Not on file     Physically abused: Not on file     Forced sexual activity: Not on file   Other Topics Concern    Not on file   Social History Narrative    Not on file     Current Outpatient Medications on File Prior to Visit   Medication Sig Dispense Refill    Topiramate (TOPAMAX PO) Take by mouth      Norgestimate-Eth Estradiol (SPRINTEC 28 PO) Take by mouth daily      cyanocobalamin 1000 MCG/ML injection Inject 1 mL into the muscle every 30 days 1 vial 5    albuterol sulfate  (90 Base) MCG/ACT inhaler Inhale 2 puffs into the lungs every 6 hours as needed      luliconazole (LUZU) 1 % CREA cream Apply 1 Film topically 2 times daily (Patient not taking: Reported on 2020) 1 Tube 1    Syringe/Needle, Disp, (SYRINGE 3CC/25GX5/8\") 25G X 5/8\" 3 ML MISC Use as directed to administer B12 once a month 1 each 11     No current facility-administered medications on file prior to visit.       Past Medical History:   Diagnosis Date    Acid reflux     Endometriosis 2014    Hypertension     POTS (postural orthostatic tachycardia syndrome)     Small fiber neuropathy      Past Surgical History:   Procedure Laterality Date    BREAST CYST EXCISION Right 2017     SECTION  2019    Uterine extension    NASAL SEPTUM SURGERY  2018    OVARIAN CYST REMOVAL Right visit)          [x] No gaze palsy        [] Abnormal-         Skin:        [x] No significant exanthematous lesions or discoloration noted on facial skin         [] Abnormal-            Psychiatric:       [x] Normal Affect [] No Hallucinations        [] Abnormal-   Judgment, behavior, thought and mood are normal.     (During BOZTR-06 public health emergency), evaluation of the following organ systems was limited: Vitals/Constitutional/EENT/Resp/CV/GI//MS/Neuro/Skin/Heme-Lymph-Imm. Pursuant to the emergency declaration under the [address], [address] [place] [address] authority and the [employer] Act, this Virtual Visit was conducted with patient's (and/or legal guardian's) consent, to reduce the patient's risk of exposure to COVID-19 and provide necessary medical care. The patient (and/or legal guardian) has also been advised to contact this office for worsening conditions or problems, and seek emergency medical treatment and/or call 911 if deemed necessary. Patient initiated the encounter and gave consent for the encounter. Services were provided through a video synchronous discussion virtually to substitute for in-person clinic visit. Patient and provider were located at their individual homes. Diagnosis Orders   1. Acute maxillary sinusitis, recurrence not specified  azithromycin (ZITHROMAX) 250 MG tablet    Lara Merlin, Stephanie Camacho DO, [facility]   2. Exposure to [place] [facility] was seen today for other and sinus problem. Diagnoses and all orders for this visit:    Acute maxillary sinusitis, recurrence not specified  -     azithromycin (ZITHROMAX) 250 MG tablet;  Take 2 pills day one, one pill day 2-5      With exposure to [facility] Stephanie Camacho DO, [facility]      -     fluticasone (FLONASE) 50 MCG/ACT nasal spray; 1 spray by Each Nostril route daily    Plan is to treat her recurrent sinusitis and add Flonase for nasal congestion and can help her eyes as well. She is going to have an outside lab to testing for specific types of mold. As far as testing for mold exposure I can do a respiratory blood draw for allergens to see if she is allergic to mold, but she has had blood testing previously which was negative for mold despite her symptoms, or refer her to an allergist for allergy testing specifically and this was discussed but she has had sinus surgery in the past so I gave her referral to ENT. She will call if her symptoms do not resolve after the antibiotic and Flonase.   20 minute time spent

## 2020-08-05 NOTE — TELEPHONE ENCOUNTER
Concern for symptoms d/t known mold in her air intake system. Son has the same symptoms. Pt wants mold testing for exposure done. Pt having allergy symptoms of runny nose, occasional cough, sinus congestion. Reason for Disposition   Patient wants to be seen    Protocols used: SINUS PAIN AND CONGESTION-ADULT-[place]    Received call from [facility]. Agrees with discharge disposition, care advice. Call soft transferred to [address] to schedule appointment. Please do not reply to the triage nurse through this encounter. Any subsequent communication should be directly with the patient.

## 2020-08-31 NOTE — PROGRESS NOTES
9/3/2020    TELEHEALTH EVALUATION -- [facility]Visual [facility])    HPI:  Antonia Sanchez is a 31 y.o. female who initally presented for follow up for POTS (2018). She has a PMH of low Vit B12 (that she gets B 12 injections), IBS, HTN,, and small fiber neuropathy. An Echocardiogram from 7/28/10 [facility]) demonstrated EF 55%. She states she had a positive tilt table test.  She quit smoking 6 years ago. She does not use any illegal drugs and only drinks maybe twice a year. Echo on 6/10/2020 shows an EF of 55%. Antonia Sanchez (:  1990) has requested an audio/video evaluation for the following concern(s):    Today she states that she is doing well. She states that she just moved about a week ago. She states that her SOB is better. She states that her symptoms are like a panic attack. She does state that when she gets anxious she gets palpitations and dizzy. Review of Systems    Prior to Visit Medications    Medication Sig Taking? Authorizing Provider   triamcinolone (KENALOG) 0.1 % cream Apply topically 2 times daily Apply topically 2 times daily.  Yes Historical Provider, MD   BIOTIN PO Take by mouth Yes Historical Provider, MD   Magnesium 80 MG TABS Take by mouth Yes Historical Provider, MD   fluticasone (FLONASE) 50 MCG/ACT nasal spray 1 spray by Each Nostril route daily Yes Ines Parrish DO   cyanocobalamin 1000 MCG/ML injection Inject 1 mL into the muscle every 30 days Yes Ines Parrish DO   albuterol sulfate  (90 Base) MCG/ACT inhaler Inhale 2 puffs into the lungs every 6 hours as needed Yes Historical Provider, MD   Syringe/Needle, Disp, (SYRINGE 3CC/25GX5/8\") 25G X 5/8\" 3 ML MISC Use as directed to administer B12 once a month Yes Ines Parrish DO       Social History     Tobacco Use    Smoking status: Former Smoker     Last attempt to quit: 2014     Years since quittin.2    Smokeless tobacco: Never Used   Substance Use Topics    Alcohol use: Not Currently    Drug use: Never        Past Medical History:   Diagnosis Date    Acid reflux     Endometriosis 09/2014    Hypertension     POTS (postural orthostatic tachycardia syndrome)     Small fiber neuropathy        PHYSICAL EXAMINATION:  [ INSTRUCTIONS:  \"[x]\" Indicates a positive item  \"[]\" Indicates a negative item  -- DELETE ALL ITEMS NOT EXAMINED]  Vital Signs: (As obtained by patient/caregiver or practitioner observation)    Blood pressure-  Heart rate-    Respiratory rate-    Temperature-  Pulse oximetry-     Constitutional: [x] Appears well-developed and well-nourished [x] No apparent distress      [] Abnormal-   Mental status  [x] Alert and awake  [] Oriented to person/place/time [x]Able to follow commands      Eyes:  EOM    [x]  Normal  [] Abnormal-  Sclera  [x]  Normal  [] Abnormal -         Discharge [x]  None visible  [] Abnormal -    HENT:   [x] Normocephalic, atraumatic. [] Abnormal   [x] Mouth/Throat: Mucous membranes are moist.     External Ears [] Normal  [] Abnormal-     Neck: [x] No visualized mass     Pulmonary/Chest: [x] Respiratory effort normal.  [x] No visualized signs of difficulty breathing or respiratory distress        [] Abnormal-      Musculoskeletal:   [x] Normal gait with no signs of ataxia         [x] Normal range of motion of neck        [] Abnormal-       Neurological:        [x] No Facial Asymmetry (Cranial nerve 7 motor function) (limited exam to video visit)          [x] No gaze palsy        [] Abnormal-         Skin:        [x] No significant exanthematous lesions or discoloration noted on facial skin         [] Abnormal-            Psychiatric:       [x] Normal Affect [] No Hallucinations        [] Abnormal-     Other pertinent observable physical exam findings-     ASSESSMENT/PLAN:  1.  POTS (postural orthostatic tachycardia syndrome)  05/26/2020  Patient is a pleasant 26-year-old female with a history of tilt table diagnosed pots, and small fiber neuropathy who is being evaluated for symptomatic tachycardia with palpitations.  Resting heart rate was around 60 however control into the 120s when she is mildly active.  I am unclear at this point how symptomatic she is with her pots. Deny Bond had a repeat tilt table test done by the [facility] which was negative for pots. Salina Alejandre is having some volume overload complaints with swelling in her legs and her arms. Deny Bond is post pregnancy and so I am concerned for post partum cardiomyopathy.  I would like to start with an ultrasound and some lab work to see/make sure no other potential physiology occurring that may explain her symptoms.  After her ultrasound and lab work we will consider management of her pots which will include increase fluid intake, increase salt intake, and possibly augmentation with medication such as fludrocortisone or midodrine.  We will follow-up with her for her labs.     07/02/2020  Labs reassuring. Echocardiogram reassuring. Patient having symptoms without air conditioning. Once restored then will let us know how she is doing. 09/03/2020  Patient doing well. Still having some palpitations and tachycardia. Recommended starting on PRN propanolol for anxiety and palpitations. - Propanolol 40 mg Q6H PRN tachycardia and anxiety. - Follow up in 3 months.     2. Shortness of breath. 05/26/2020  Etiology unclear.  Occurs with exertion.  May be sinus tachycardia, inappropriate sinus tachycardia, or postural orthostatic tachycardia.  I suspect it is most likely related to deconditioning, however we will work-up given the severity of her symptoms. 09/03/2020  Likely related to anxiety and panic attacks. - Plan as per above.     3. Swelling. 05/26/2020  Etiology unclear.  May be related to prednisone however she states that temporarily it started well before she got started on her prednisone.  Plan as per above. 09/03/2020  Resolved. Likely steroid related. - Continue to monitor clinically.     Jacquie Dumont is a 31 y.o. female being evaluated by a Virtual Visit (video visit) encounter to address concerns as mentioned above. A caregiver was present when appropriate. Due to this being a TeleHealth encounter (During [facility] public health emergency), evaluation of the following organ systems was limited: Vitals/Constitutional/EENT/Resp/CV/GI//MS/Neuro/Skin/Heme-Lymph-Imm. Pursuant to the emergency declaration under the [address] [place] [address], [address] [place] [address] and the [employer] Act, this Virtual Visit was conducted with patient's (and/or legal guardian's) consent, to reduce the patient's risk of exposure to COVID-19 and provide necessary medical care. The patient (and/or legal guardian) has also been advised to contact this office for worsening conditions or problems, and seek emergency medical treatment and/or call 911 if deemed necessary. Patient identification was verified at the start of the visit: Yes    Total time spent on this encounter: 22    Services were provided through a video synchronous discussion virtually to substitute for in-person clinic visit. Patient and provider were located at their individual homes. --Susie Blake RN on 9/3/2020 at 12:16 PM      This note was scribed in the presence of Jeanne Kenney MD by Susie Blake RN. An electronic signature was used to authenticate this note. The scribe's documentation has been prepared under my direction and personally reviewed by me in its entirety. I confirm that the note above accurately reflects all work, physical examination, the discussion of treatments and procedures, and medical decision making performed by me. Chidi Bauman MD personally performed the services described in this documentation as scribed by nurse in my presence, and is both accurate and complete.     Electronically signed by Olvin Mitchell MD on 9/3/2020 at 12:50 PM

## 2020-09-03 ENCOUNTER — TELEPHONE (OUTPATIENT)
Dept: CARDIOLOGY CLINIC | Age: 30
End: 2020-09-03

## 2020-09-03 ENCOUNTER — VIRTUAL VISIT (OUTPATIENT)
Dept: CARDIOLOGY CLINIC | Age: 30
End: 2020-09-03
Payer: COMMERCIAL

## 2020-09-03 PROCEDURE — G8419 CALC BMI OUT NRM PARAM NOF/U: HCPCS | Performed by: INTERNAL MEDICINE

## 2020-09-03 PROCEDURE — 99214 OFFICE O/P EST MOD 30 MIN: CPT | Performed by: INTERNAL MEDICINE

## 2020-09-03 PROCEDURE — G8427 DOCREV CUR MEDS BY ELIG CLIN: HCPCS | Performed by: INTERNAL MEDICINE

## 2020-09-03 PROCEDURE — 1036F TOBACCO NON-USER: CPT | Performed by: INTERNAL MEDICINE

## 2020-09-03 RX ORDER — TRIAMCINOLONE ACETONIDE 1 MG/G
CREAM TOPICAL 2 TIMES DAILY
COMMUNITY
End: 2021-03-03 | Stop reason: ALTCHOICE

## 2020-09-03 RX ORDER — PROPRANOLOL HYDROCHLORIDE 40 MG/1
40 TABLET ORAL EVERY 6 HOURS PRN
Qty: 90 TABLET | Refills: 3 | Status: SHIPPED | OUTPATIENT
Start: 2020-09-03 | End: 2021-03-03 | Stop reason: ALTCHOICE

## 2020-09-03 NOTE — PATIENT INSTRUCTIONS
1. POTS  2. SOB  3. Palpitations-Start Propanolol 40mg every 6 hours prn anxiety and tachycardia symptoms. Call back in a few weeks to let me know how this is working. Follow up in 3 months.

## 2020-10-21 ENCOUNTER — NURSE TRIAGE (OUTPATIENT)
Dept: OTHER | Facility: CLINIC | Age: 30
End: 2020-10-21

## 2020-10-21 NOTE — TELEPHONE ENCOUNTER
Reason for Disposition   Information only question and nurse able to answer    Answer Assessment - Initial Assessment Questions  1. REASON FOR CALL or QUESTION: \"What is your reason for calling today? \" or \"How can I best help you? \" or \"What question do you have that I can help answer? \"      Pt had migraine 2 months ago and had \"kelidescope vision\".  Eye sight is cloudy but normal now    Protocols used: INFORMATION ONLY CALL - NO TRIAGE-ADULT-[place]

## 2020-10-22 ENCOUNTER — TELEPHONE (OUTPATIENT)
Dept: FAMILY MEDICINE CLINIC | Age: 30
End: 2020-10-22

## 2020-11-17 ENCOUNTER — VIRTUAL VISIT (OUTPATIENT)
Dept: FAMILY MEDICINE CLINIC | Age: 30
End: 2020-11-17
Payer: COMMERCIAL

## 2020-11-17 PROBLEM — I70.8 ATHEROSCLEROSIS OF OTHER ARTERIES: Status: ACTIVE | Noted: 2020-11-17

## 2020-11-17 PROCEDURE — G8428 CUR MEDS NOT DOCUMENT: HCPCS | Performed by: FAMILY MEDICINE

## 2020-11-17 PROCEDURE — 99214 OFFICE O/P EST MOD 30 MIN: CPT | Performed by: FAMILY MEDICINE

## 2020-11-17 RX ORDER — KETOROLAC TROMETHAMINE 5 MG/ML
1 SOLUTION OPHTHALMIC 4 TIMES DAILY
Qty: 1 BOTTLE | Refills: 1 | Status: SHIPPED | OUTPATIENT
Start: 2020-11-17 | End: 2020-11-24

## 2020-11-17 RX ORDER — CYANOCOBALAMIN 1000 UG/ML
1000 INJECTION INTRAMUSCULAR; SUBCUTANEOUS
Qty: 1 VIAL | Refills: 5 | Status: SHIPPED | OUTPATIENT
Start: 2020-11-17 | End: 2021-05-20

## 2020-11-17 NOTE — PROGRESS NOTES
TELEHEALTH EVALUATION -- Audio/Visual (During PXLNC-19 public health emergency)    HPI:  The patient has requested an audio/video evaluation for the following concern(s):  Chief Complaint   Patient presents with    Itchy Eye     red, matted shut in AM x 2 months-bilaterally    ED Follow-up     headaches    went to ER for chronic headaches, CT done and reviewed, and result copied below. .   She was concerned about the finding and did a follow up phone call with her neurologist.  Her CT showed some atherosclerosis, and she does have a family hx of heart disease and strokes. Her labs from June were reviewed, cbc,cmp tsh, b12 and folate normal. She is due for a lipid screening. Reviewed neurology phone call and MRI not recommended at this time and headache medication adjusted. Neurologist recommend she follow up with ENT because she is getting clear sinus drainage and did have sinus surgery in 2018. I have referred her to ENT in August for sinusitis and eval for mold allergy evaluation, but she did not schedule it yet. She has been usuing flonase but it hasnt helped, so I recommend she stop using it because it is not effective for her. Eyes are at times itchy, but she notices redness on the inside of the lower and upper lids. At times her vision is blurry and she is having a difficult time driving at night occasionally. . See neurology note  Results In - 11/13/2020  9:36 PM EST  EXAM: CT HEAD WO CONTRAST     INDICATION: headaches,     TECHNIQUE: Axial thin section CT images of the head were obtained without contrast. Sagittal and coronal 2-D multiplanar reconstructions were performed at the scanner. COMPARISON: None available. FINDINGS:    The diagnostic quality of the examination is adequate. Brain parenchyma: The gray-white matter junction is preserved. No evidence of intracranial hemorrhage. No mass effect or midline shift. Ventricles and extraaxial spaces: Normal ventricular system.  No extra-axial fluid collection. Orbits, paranasal sinuses, mastoids: No acute orbital abnormality. Clear paranasal sinuses. Clear mastoid air cells. Extracranial soft tissues: Normal.    Calvarium and skull base: No acute osseous abnormalities. Other: Atherosclerotic vascular calcifications. IMPRESSION:    1. No acute intracranial abnormality. Review of Systems as above  Constitutional: Negative. HENT: see above    Eyes: + for blurred vision, discharge and redness. Respiratory: Negative for chest tightness and shortness of breath. Cardiovascular: Negative for chest pain and palpitations. Gastrointestinal: Negative. Endocrine: Negative. Genitourinary: Negative. Musculoskeletal: Negative. Allergic/Immunologic: Negative for immunocompromised state. Neurological: Negative for dizziness. Psychiatric/Behavioral: Negative for agitation, behavioral problems and confusion. All other systems reviewed and are negative. .      Constitutional: [x] Appears well-developed and well-nourished [x] No apparent distress      [] Abnormal-   Mental status  [x] Alert and awake  [x] Oriented to person/place/time [x]Able to follow commands      Eyes:  EOM    [x]  Normal  [] Abnormal-  Conjunctiva injected         Discharge [x]  None visible  [] Abnormal -    HENT:   [x] Normocephalic, atraumatic.   [] Abnormal   [] Mouth/Throat: Mucous membranes are moist.     External Ears [x] Normal  [] Abnormal-     Neck: [x] No visualized mass     Pulmonary/Chest: [x] Respiratory effort normal.  [x] No visualized signs of difficulty breathing or respiratory distress        [] Abnormal-    Able to speak in full sentences without difficulty  Musculoskeletal:   [] Normal gait with no signs of ataxia         [x] Normal range of motion of neck        [] Abnormal-       Neurological:        [x] No Facial Asymmetry (Cranial nerve 7 motor function) (limited exam to video visit)          [x] No gaze palsy        [] Abnormal-         Skin:        [x] No significant exanthematous lesions or discoloration noted on facial skin         [] Abnormal-            Psychiatric:       [x] Normal Affect [] No Hallucinations        [] Abnormal-   Judgment, behavior, thought and mood are normal.     (During DAAIQ-37 public health emergency), evaluation of the following organ systems was limited: Vitals/Constitutional/EENT/Resp/CV/GI//MS/Neuro/Skin/Heme-Lymph-Imm. Pursuant to the emergency declaration under the [address] [place] [address], [address] [place] [address] and the [employer] and [employer] Act, this Virtual Visit was conducted with patient's (and/or legal guardian's) consent, to reduce the patient's risk of exposure to COVID-19 and provide necessary medical care. The patient (and/or legal guardian) has also been advised to contact this office for worsening conditions or problems, and seek emergency medical treatment and/or call 911 if deemed necessary. Patient initiated the encounter and gave consent for the encounter. Services were provided through a video synchronous discussion virtually to substitute for in-person clinic visit. Patient and provider were located at their individual homes. Diagnosis Orders   1. Changes in vision  QUYEN - Sukh Jasmine MD, Ophthalmology, [facility]   2. Conjunctivitis of both eyes, unspecified conjunctivitis type     3. Screening for lipid disorders  Lipid Panel   4. Atherosclerosis of other arteries, CT head 2020     5. Family history of heart disease     6. Family history of stroke         Changes in vision, associated with probable eye allergies. Stop flonase, add acular for the injection. Follow up with ophthalmology due to the blurred vision and problems with nighttime driving.   Conjunctivitis of both eyes, unspecified conjunctivitis type  -     QUYEN Jasmine MD, Ophthalmology, [facility]    ketorolac (ACULAR) 0.5 % ophthalmic solution; Place 1 drop into both eyes 4 times daily for 7 days    Atherosclerosis of other arteries, CT head 2020  Screening for lipid disorders due to atherosclerosis, new finding on CT of head. Discussed possible statin if elevated and its benefits to atherosclerosis. Complicated by  Family history of heart disease  andFamily history of stroke-      lipid Panel; Future    Other orders  -     cyanocobalamin 1000 MCG/ML injection;  Inject 1 mL into the muscle every 30 days  -     INFLUENZA, QUADV, 3 YRS AND OLDER, IM PF, PREFILL SYR OR SDV, 0.5ML (AFLURIA QUADV, PF)  -             25 minute time spent

## 2020-12-02 NOTE — TELEPHONE ENCOUNTER
This printed off. Can you please resend to her pharmacy.
Detail Level: Detailed
General Sunscreen Counseling: I recommended a broad spectrum sunscreen with a SPF of 30 or higher.  I explained that SPF 30 sunscreens block approximately 97 percent of the sun's harmful rays.  Sunscreens should be applied at least 15 minutes prior to expected sun exposure and then every 2 hours after that as long as sun exposure continues. If swimming or exercising sunscreen should be reapplied every 45 minutes to an hour after getting wet or sweating.  One ounce, or the equivalent of a shot glass full of sunscreen, is adequate to protect the skin not covered by a bathing suit. I also recommended a lip balm with a sunscreen as well. Sun protective clothing can be used in lieu of sunscreen but must be worn the entire time you are exposed to the sun's rays.

## 2020-12-03 ENCOUNTER — VIRTUAL VISIT (OUTPATIENT)
Dept: FAMILY MEDICINE CLINIC | Age: 30
End: 2020-12-03
Payer: COMMERCIAL

## 2020-12-03 PROCEDURE — 99212 OFFICE O/P EST SF 10 MIN: CPT | Performed by: INTERNAL MEDICINE

## 2020-12-03 RX ORDER — NAPROXEN 500 MG/1
500 TABLET ORAL 2 TIMES DAILY WITH MEALS
COMMUNITY
Start: 2020-05-21 | End: 2021-03-03 | Stop reason: ALTCHOICE

## 2020-12-03 RX ORDER — TOPIRAMATE 25 MG/1
25 TABLET ORAL 2 TIMES DAILY
COMMUNITY
Start: 2020-07-16 | End: 2021-03-03 | Stop reason: ALTCHOICE

## 2020-12-03 ASSESSMENT — ENCOUNTER SYMPTOMS
EYE REDNESS: 0
EYE ITCHING: 1
VOMITING: 0
EYE PAIN: 0
COUGH: 0
DIARRHEA: 0
NAUSEA: 0

## 2020-12-03 NOTE — PROGRESS NOTES
12/3/2020    TELEHEALTH EVALUATION -- Audio/Visual (During SPPLW-30 public health emergency)    HPI:    Sheryl Vergara (:  1990) has requested an audio/video evaluation for the following concern(s):    CC- eye issues  Concerned about her eye, some blurry vision. Crusty bpth eyes when she wakes up in the morning and also itchy. No redness or dringe noticed when seen. Saw primary care  And has a referral for ophthalmologist, having problems with her insurance and advised to follow up with in. Also advised her to see an optometrist for now , easier to get in  And can work around with her insurance  Says she already has an appointment. Review of Systems   Constitutional: Negative for chills, fatigue and fever. HENT: Negative. Eyes: Positive for itching and visual disturbance. Negative for pain and redness. Respiratory: Negative for cough. Cardiovascular: Negative for chest pain and leg swelling. Gastrointestinal: Negative for diarrhea, nausea and vomiting. Neurological: Negative for dizziness. Prior to Visit Medications    Medication Sig Taking? Authorizing Provider   topiramate (TOPAMAX) 25 MG tablet Take 25 mg by mouth 2 times daily  Historical Provider, MD   [facility] [address] 0.25-35 MG-MCG per tablet TAKE 1 TABLET BY MOUTH EVERY DAY  Historical Provider, MD   naproxen (NAPROSYN) 500 MG tablet Take 500 mg by mouth 2 times daily (with meals)  Historical Provider, MD   cyanocobalamin 1000 MCG/ML injection Inject 1 mL into the muscle every 30 days  Felix Boyle,    triamcinolone (KENALOG) 0.1 % cream Apply topically 2 times daily Apply topically 2 times daily.   Historical Provider, MD   BIOTIN PO Take by mouth  Historical Provider, MD   Magnesium 80 MG TABS Take by mouth  Historical Provider, MD   propranolol (INDERAL) 40 MG tablet Take 1 tablet by mouth every 6 hours as needed (for tachycardia and anxiety)  Bertram Lino MD   fluticasone (FLONASE) 50 MCG/ACT nasal spray 1 spray by Each Nostril route daily  Keyla Grey DO   albuterol sulfate  (90 Base) MCG/ACT inhaler Inhale 2 puffs into the lungs every 6 hours as needed  Historical Provider, MD   Syringe/Needle, Disp, (SYRINGE 3CC/25GX5/8\") 25G X 5/8\" 3 ML MISC Use as directed to administer B12 once a month  Keyla Grey DO       Social History     Tobacco Use    Smoking status: Former Smoker     Last attempt to quit: 2014     Years since quittin.5    Smokeless tobacco: Never Used   Substance Use Topics    Alcohol use: Not Currently    Drug use: Never          PHYSICAL EXAMINATION:    Constitutional: [x] Appears well-developed and well-nourished [] No apparent distress      [] Abnormal-   Mental status  [x] Alert and awake  [x] Oriented to person/place/time [x]Able to follow commands      Eyes:  EOM    [x]  Normal  [] Abnormal-  Sclera  [x]  Normal  [] Abnormal -         Discharge [x]  None visible  [] Abnormal -    HENT:   [x] Normocephalic, atraumatic. [] Abnormal   [] Mouth/Throat: Mucous membranes are moist.     External Ears [] Normal  [] Abnormal-     Neck: [] No visualized mass     Pulmonary/Chest: [x] Respiratory effort normal.  [x] No visualized signs of difficulty breathing or respiratory distress        [] Abnormal-      Musculoskeletal:   [x] Normal gait with no signs of ataxia         [x] Normal range of motion of neck        [] Abnormal-       Neurological:        [x] No Facial Asymmetry (Cranial nerve 7 motor function) (limited exam to video visit)          [x] No gaze palsy        [] Abnormal-         Skin:        [] No significant exanthematous lesions or discoloration noted on facial skin         [] Abnormal-            Psychiatric:       [x] Normal Affect [x] No Hallucinations        [] Abnormal-         ASSESSMENT/PLAN  . 1. Changes in vision  -Has a referral for opthalmology    Follow up with her primary care as needed.      Mary Kate Taylor is a 31 y.o. female being evaluated by a Virtual Visit (video visit) encounter to address concerns as mentioned above. A caregiver was present when appropriate. Due to this being a TeleHealth encounter (During SJS-64 public health emergency), evaluation of the following organ systems was limited: Vitals/Constitutional/EENT/Resp/CV/GI//MS/Neuro/Skin/Heme-Lymph-Imm. Pursuant to the emergency declaration under the [address] [place] [address] and the [employer] and [employer] Act, this Virtual Visit was conducted with patient's (and/or legal guardian's) consent, to reduce the patient's risk of exposure to COVID-19 and provide necessary medical care. The patient (and/or legal guardian) has also been advised to contact this office for worsening conditions or problems, and seek emergency medical treatment and/or call 911 if deemed necessary. Patient identification was verified at the start of the visit: Yes    Total time spent on this encounter: 15 min    Services were provided through a video synchronous discussion virtually to substitute for in-person clinic visit. Patient and provider were located at their individual homes. --Rainelle Kanner, MD on 12/3/2020 at 12:02 PM    An electronic signature was used to authenticate this note.

## 2020-12-08 ENCOUNTER — TELEPHONE (OUTPATIENT)
Dept: CARDIOLOGY CLINIC | Age: 30
End: 2020-12-08

## 2020-12-08 ENCOUNTER — VIRTUAL VISIT (OUTPATIENT)
Dept: CARDIOLOGY CLINIC | Age: 30
End: 2020-12-08
Payer: COMMERCIAL

## 2020-12-08 VITALS
OXYGEN SATURATION: 97 % | BODY MASS INDEX: 26.82 KG/M2 | SYSTOLIC BLOOD PRESSURE: 139 MMHG | HEART RATE: 75 BPM | WEIGHT: 161 LBS | DIASTOLIC BLOOD PRESSURE: 79 MMHG | HEIGHT: 65 IN

## 2020-12-08 PROCEDURE — 1036F TOBACCO NON-USER: CPT | Performed by: INTERNAL MEDICINE

## 2020-12-08 PROCEDURE — 99213 OFFICE O/P EST LOW 20 MIN: CPT | Performed by: INTERNAL MEDICINE

## 2020-12-08 PROCEDURE — G8484 FLU IMMUNIZE NO ADMIN: HCPCS | Performed by: INTERNAL MEDICINE

## 2020-12-08 PROCEDURE — G8419 CALC BMI OUT NRM PARAM NOF/U: HCPCS | Performed by: INTERNAL MEDICINE

## 2020-12-08 PROCEDURE — G8427 DOCREV CUR MEDS BY ELIG CLIN: HCPCS | Performed by: INTERNAL MEDICINE

## 2020-12-08 RX ORDER — PROPRANOLOL HYDROCHLORIDE 80 MG/1
80 CAPSULE, EXTENDED RELEASE ORAL DAILY
Qty: 30 CAPSULE | Refills: 3 | Status: SHIPPED | OUTPATIENT
Start: 2020-12-08 | End: 2021-03-11

## 2020-12-08 NOTE — PROGRESS NOTES
2020    Date: 20  Patient Name: Hayes Moore  YOB: 1990    Chief Complaint:   Chief Complaint   Patient presents with    3 Month Follow-Up    Other     POTS      Primary Care Physician: Diana Tapia DO     TELEHEALTH EVALUATION -- Audio (During DGMJQ-11 public health emergency)    [place]: Hayes Moore is a 31 y.o. female who initally presented for follow up for POTS (2018). She has a PMH of low Vit B12 (that she gets B 12 injections), IBS, HTN,, and small fiber neuropathy. An Echocardiogram from 7/28/10 [facility]) demonstrated EF 55%. She stated she had a positive tilt table test.  She quit smoking 6 years ago. She does not use any illegal drugs and only drinks maybe twice a year. Echo on 6/10/2020 shows an EF of 55%. At her office visit on 9/3/20 she reported her SOB had improved but was still experiencing palpitations and dizziness with her anxiety. Hayes Moore (:  1990) has requested an audio/video evaluation for the following concern(s):    Today 20 she reports she is doing well. She takes her propranolol every day in the morning and if she is having a lot of issues with her POTS that day and her HR, BP, and dizziness occur, she will take it again about 6 hours later. She feels that the propranolol has helped her POTS. She reports she has had a migraine for 2 months. Dr Shanel Bone at [address] [place] [address] is her neurologist. He wanted to double check with our office to see if increasing her propranolol would be an option. She reports she is taking all medications as prescribed and tolerates them well. Patient denies current chest pain, sob, palpitations, dizziness or syncope. Review of Systems    Prior to Visit Medications    Medication Sig Taking?  Authorizing Provider   topiramate (TOPAMAX) 25 MG tablet Take 25 mg by mouth 2 times daily Yes Historical Provider, MD   [address] [address] 0.25-35 MG-MCG per tablet TAKE 1 TABLET BY MOUTH EVERY DAY Yes Historical Provider, MD naproxen (NAPROSYN) 500 MG tablet Take 500 mg by mouth 2 times daily (with meals) Yes Historical Provider, MD   cyanocobalamin 1000 MCG/ML injection Inject 1 mL into the muscle every 30 days Yes Jeri Trinidad DO   triamcinolone (KENALOG) 0.1 % cream Apply topically 2 times daily Apply topically 2 times daily. Yes Historical Provider, MD   BIOTIN PO Take by mouth Yes Historical Provider, MD   Magnesium 80 MG TABS Take by mouth Yes Historical Provider, MD   propranolol (INDERAL) 40 MG tablet Take 1 tablet by mouth every 6 hours as needed (for tachycardia and anxiety) Yes Hunter Mchugh MD   fluticasone (FLONASE) 50 MCG/ACT nasal spray 1 spray by Each Nostril route daily Yes Jeri Trinidad DO   albuterol sulfate  (90 Base) MCG/ACT inhaler Inhale 2 puffs into the lungs every 6 hours as needed Yes Historical Provider, MD   Syringe/Needle, Disp, (SYRINGE 3CC/25GX5/8\") 25G X 5/8\" 3 ML MISC Use as directed to administer B12 once a month Yes Jeri Trinidad DO       Social History     Tobacco Use    Smoking status: Former Smoker     Last attempt to quit: 2014     Years since quittin.5    Smokeless tobacco: Never Used   Substance Use Topics    Alcohol use: Not Currently    Drug use: Never      Unable to do physical assessment on patient d/t phone visit. IMPRESSION:  1. POTS (postural orthostatic tachycardia syndrome)  2020  Patient is a pleasant 79-year-old female with a history of tilt table diagnosed pots, and small fiber neuropathy who is being evaluated for symptomatic tachycardia with palpitations. Resting heart rate was around 60 however control into the 120s when she is mildly active. I am unclear at this point how symptomatic she is with her pots. She had a repeat tilt table test done by the [facility] which was negative for pots. He is having some volume overload complaints with swelling in her legs and her arms.   She is post pregnancy and so I am concerned for post partum cardiomyopathy. I would like to start with an ultrasound and some lab work to see/make sure no other potential physiology occurring that may explain her symptoms. After her ultrasound and lab work we will consider management of her pots which will include increase fluid intake, increase salt intake, and possibly augmentation with medication such as fludrocortisone or midodrine. We will follow-up with her for her labs. 07/02/2020  Labs reassuring. Echocardiogram reassuring. Patient having symptoms without air conditioning. Once restored then will let us know how she is doing. 09/03/2020  Patient doing well. Still having some palpitations and tachycardia. Recommended starting on PRN propanolol for anxiety and palpitations. - Propanolol 40 mg Q6H PRN tachycardia and anxiety. - Follow up in 3 months. 12/8/2020  Patient doing well. Symptoms seem to be worsening. Having some migraines as well. Interested in increasing propanolol. Blood pressure appears to be able to tolerate increase. We will increase her protocol to 80 mg daily and may increase further depending on her symptoms and her migraine headaches. Follow-up with me my next available appointment.  - Start on propanolol 80 mg daily with PRN 40 mg Q6H.  - Follow up with me at next available appointment. 2. Shortness of breath. 05/26/2020  Etiology unclear. Occurs with exertion. May be sinus tachycardia, inappropriate sinus tachycardia, or postural orthostatic tachycardia. I suspect it is most likely related to deconditioning, however we will work-up given the severity of her symptoms. 09/03/2020  Likely related to anxiety and panic attacks. - Plan as per above. 12/8/2020  Plan as per above. 3. Swelling. 05/26/2020  Etiology unclear. May be related to prednisone however she states that temporarily it started well before she got started on her prednisone. Plan as per above. 09/03/2020 - 12/08/2020  Resolved. Likely steroid related. - Continue to monitor clinically. PLAN:  1. Start Propranolol 80mg per day.    -continue propanolol 40mg PRN. 2. Follow up in February 2021. This note has been scribed in the presence of Taylor Mcdonald MD by Herson Bregeron RN. Kevin Bustillo is a 31 y.o. female being evaluated by a Virtual Visit (video visit) encounter to address concerns as mentioned above. A caregiver was present when appropriate. Due to this being a TeleHealth encounter (During CRT-10 public health emergency), evaluation of the following organ systems was limited: Vitals/Constitutional/EENT/Resp/CV/GI//MS/Neuro/Skin/Heme-Lymph-Imm. Pursuant to the emergency declaration under the [address] [place] [address] authority and the [employer] and [employer] Act, this Virtual Visit was conducted with patient's (and/or legal guardian's) consent, to reduce the patient's risk of exposure to COVID-19 and provide necessary medical care. The patient (and/or legal guardian) has also been advised to contact this office for worsening conditions or problems, and seek emergency medical treatment and/or call 911 if deemed necessary. Patient identification was verified at the start of the visit: Yes    Total time spent on this encounter: 15 minutes. The scribe's documentation has been prepared under my direction and personally reviewed by me in its entirety. I confirm that the note above accurately reflects all work, physical examination, the discussion of treatments and procedures, and medical decision making performed by me. Brianna Hoffmann MD personally performed the services described in this documentation as scribed by nurse in my presence, and is both accurate and complete.     Electronically signed by Hunter Yeung MD on 12/8/2020 at 7:19 PM

## 2020-12-08 NOTE — TELEPHONE ENCOUNTER
Pt reports having more frequent episodes of POTS. Pt reports that she has lost weight, but not certain as to why. Pt also stated that she recorded most recent VS in her [facility].

## 2020-12-08 NOTE — PROGRESS NOTES
2020    Date: 20  Patient Name: Charlotte Garnica  YOB: 1990    Chief Complaint: No chief complaint on file. Primary Care Physician: Moy Carr DO     TELEHEALTH EVALUATION -- Audio/Visual (During OVIGP-84 public health emergency)    [place]: Charlotte Garnica is a 31 y.o. female who initally presented for follow up for POTS (2018). She has a PMH of low Vit B12 (that she gets B 12 injections), IBS, HTN,, and small fiber neuropathy. An Echocardiogram from 7/28/10 [facility]) demonstrated EF 55%. She stated she had a positive tilt table test.  She quit smoking 6 years ago. She does not use any illegal drugs and only drinks maybe twice a year. Echo on 6/10/2020 shows an EF of 55%. At her office visit on 9/3/20 she reported her SOB had improved but was still experiencing palpitations and dizziness with her anxiety. Charlotte Garnica (:  1990) has requested an audio/video evaluation for the following concern(s):    Today 20 she reports ***. Review of Systems    Prior to Visit Medications    Medication Sig Taking? Authorizing Provider   topiramate (TOPAMAX) 25 MG tablet Take 25 mg by mouth 2 times daily  Historical Provider, MD   [facility] [place] [address] 0.25-35 MG-MCG per tablet TAKE 1 TABLET BY MOUTH EVERY DAY  Historical Provider, MD   naproxen (NAPROSYN) 500 MG tablet Take 500 mg by mouth 2 times daily (with meals)  Historical Provider, MD   cyanocobalamin 1000 MCG/ML injection Inject 1 mL into the muscle every 30 days  Moy Carr DO   triamcinolone (KENALOG) 0.1 % cream Apply topically 2 times daily Apply topically 2 times daily.   Historical Provider, MD   BIOTIN PO Take by mouth  Historical Provider, MD   Magnesium 80 MG TABS Take by mouth  Historical Provider, MD   propranolol (INDERAL) 40 MG tablet Take 1 tablet by mouth every 6 hours as needed (for tachycardia and anxiety)  Shakir Baires., MD   fluticasone (FLONASE) 50 MCG/ACT nasal spray 1 spray by Each Nostril route daily Psychiatric:       [] Normal Affect [] No Hallucinations        [] Abnormal-     Other pertinent observable physical exam findings-     IMPRESSION:  1. POTS (postural orthostatic tachycardia syndrome)  05/26/2020  Patient is a pleasant 19-year-old female with a history of tilt table diagnosed pots, and small fiber neuropathy who is being evaluated for symptomatic tachycardia with palpitations. Resting heart rate was around 60 however control into the 120s when she is mildly active. I am unclear at this point how symptomatic she is with her pots. She had a repeat tilt table test done by the [facility] which was negative for pots. He is having some volume overload complaints with swelling in her legs and her arms. She is post pregnancy and so I am concerned for post partum cardiomyopathy. I would like to start with an ultrasound and some lab work to see/make sure no other potential physiology occurring that may explain her symptoms. After her ultrasound and lab work we will consider management of her pots which will include increase fluid intake, increase salt intake, and possibly augmentation with medication such as fludrocortisone or midodrine. We will follow-up with her for her labs. 07/02/2020  Labs reassuring. Echocardiogram reassuring. Patient having symptoms without air conditioning. Once restored then will let us know how she is doing. 09/03/2020  Patient doing well. Still having some palpitations and tachycardia. Recommended starting on PRN propanolol for anxiety and palpitations. - Propanolol 40 mg Q6H PRN tachycardia and anxiety. - Follow up in 3 months. 12/8/2020  ***     2. Shortness of breath. 05/26/2020  Etiology unclear. Occurs with exertion. May be sinus tachycardia, inappropriate sinus tachycardia, or postural orthostatic tachycardia. I suspect it is most likely related to deconditioning, however we will work-up given the severity of her symptoms. 09/03/2020  Likely related to anxiety and panic attacks. - Plan as per above. 12/8/2020  ***    3. Swelling. 05/26/2020  Etiology unclear. May be related to prednisone however she states that temporarily it started well before she got started on her prednisone. Plan as per above. 09/03/2020  Resolved. Likely steroid related. - Continue to monitor clinically. PLAN:  1. ***          ***    Boy Strickland is a 31 y.o. female being evaluated by a Virtual Visit (video visit) encounter to address concerns as mentioned above. A caregiver was present when appropriate. Due to this being a TeleHealth encounter (During [facility]-57 public [facility] emergency), evaluation of the following organ systems was limited: Vitals/Constitutional/EENT/Resp/CV/GI//MS/Neuro/Skin/Heme-Lymph-Imm. Pursuant to the emergency declaration under the [address] [place] [address], [address] [place] [address] and the Personal and [employer] Act, this Virtual Visit was conducted with patient's (and/or legal guardian's) consent, to reduce the patient's risk of exposure to COVID-19 and provide necessary medical care. The patient (and/or legal guardian) has also been advised to contact this office for worsening conditions or problems, and seek emergency medical treatment and/or call 911 if deemed necessary.      Patient identification was verified at the start of the visit: {YES/NO:59165}    Total time spent on this encounter: {Time Spent:34004559}    ***

## 2021-02-24 ENCOUNTER — TELEPHONE (OUTPATIENT)
Dept: CARDIOLOGY CLINIC | Age: 31
End: 2021-02-24

## 2021-02-25 ENCOUNTER — TELEPHONE (OUTPATIENT)
Dept: CARDIOLOGY CLINIC | Age: 31
End: 2021-02-25

## 2021-02-25 NOTE — TELEPHONE ENCOUNTER
Patient called stating she was not called for her VV today. Per staff patient was called 3 times for her appointment and did not answer. Offered to reschedule patient for next available with agk which is 5/13. Patient became very upset stating that was not acceptable that she has a very serious condition and needs to be seen sooner.  Patient also stated she cannot come into the because she is a single parent and does not have

## 2021-03-03 ENCOUNTER — VIRTUAL VISIT (OUTPATIENT)
Dept: FAMILY MEDICINE CLINIC | Age: 31
End: 2021-03-03
Payer: COMMERCIAL

## 2021-03-03 DIAGNOSIS — R10.9 FLANK PAIN: ICD-10-CM

## 2021-03-03 DIAGNOSIS — N30.00 ACUTE CYSTITIS WITHOUT HEMATURIA: Primary | ICD-10-CM

## 2021-03-03 PROCEDURE — 81000 URINALYSIS NONAUTO W/SCOPE: CPT | Performed by: FAMILY MEDICINE

## 2021-03-03 PROCEDURE — G8427 DOCREV CUR MEDS BY ELIG CLIN: HCPCS | Performed by: FAMILY MEDICINE

## 2021-03-03 PROCEDURE — 99213 OFFICE O/P EST LOW 20 MIN: CPT | Performed by: FAMILY MEDICINE

## 2021-03-03 RX ORDER — LIDOCAINE HCL 4 %
CREAM (GRAM) TOPICAL
COMMUNITY

## 2021-03-03 RX ORDER — SULFAMETHOXAZOLE AND TRIMETHOPRIM 800; 160 MG/1; MG/1
1 TABLET ORAL 2 TIMES DAILY
Qty: 14 TABLET | Refills: 0 | Status: SHIPPED | OUTPATIENT
Start: 2021-03-03 | End: 2021-03-10

## 2021-03-03 ASSESSMENT — PATIENT HEALTH QUESTIONNAIRE - PHQ9
SUM OF ALL RESPONSES TO PHQ9 QUESTIONS 1 & 2: 0
SUM OF ALL RESPONSES TO PHQ QUESTIONS 1-9: 0
1. LITTLE INTEREST OR PLEASURE IN DOING THINGS: 0

## 2021-03-03 NOTE — PROGRESS NOTES
TELEHEALTH EVALUATION -- Audio/Visual (During TNFRL-22 public health emergency)    HPI:  The patient has requested an audio/video evaluation for the following concern(s):  Chief Complaint   Patient presents with    Lower Back Pain    Urinary Frequency    Weight Loss     lost 20lbs in 2 months     Fever     feeling feverish and clammy    She has had frequency for several days but now it is associated with pain when she urinates and also the pain is starting to go up into her lower back. This is new. She has had no fevers but she has had chills. No nausea or vomiting. She has been more active lately since since the birth of her child and changes due to the pandemic and being a single parent now and has noticed she is losing weight. She was 154 on her home scale, last visit here she weighed 161 in December 2020. In July 2020 she weighed 171. She did have a EDG and her GI doctor recommended that she follow-up with a colonoscopy which she has not scheduled yet. Last labs are reviewed from June her B12 and folate were normal her thyroid was normalHer liver kidney function electrolytes were also normal.  And there was no anemia or elevated white count. Past Medical History:   Diagnosis Date    Acid reflux     Endometriosis 09/2014    Hypertension     POTS (postural orthostatic tachycardia syndrome)     Small fiber neuropathy        She has a history of endometriosis    Wt Readings from Last 3 Encounters:   12/08/20 161 lb (73 kg)   07/02/20 171 lb 8 oz (77.8 kg)   06/04/20 175 lb (79.4 kg)     BP Readings from Last 3 Encounters:   12/08/20 139/79   07/02/20 (!) 129/91   06/04/20 119/79        Review of Systems   As above  Allergic/Immunologic: Negative for immunocompromised state. Psychiatric/Behavioral: Negative for agitation, behavioral problems and confusion.        Physical Exam    Constitutional: [x] Appears well-developed and well-nourished [x] No apparent distress      [] Abnormal- Mental status  [x] Alert and awake  [x] Oriented to person/place/time [x]Able to follow commands      Eyes:  EOM    [x]  Normal  [] Abnormal-  Sclera  [x]  Normal  [] Abnormal -         Discharge [x]  None visible  [] Abnormal -    HENT:   [x] Normocephalic, atraumatic. [] Abnormal   [] Mouth/Throat: Mucous membranes are moist.     External Ears [x] Normal  [] Abnormal-     Neck: [x] No visualized mass     Pulmonary/Chest: [x] Respiratory effort normal.  [x] No visualized signs of difficulty breathing or respiratory distress        [] Abnormal-    Able to speak in full sentences without difficulty  Musculoskeletal:   [] Normal gait with no signs of ataxia         [x] Normal range of motion of neck        [] Abnormal-       Neurological:        [x] No Facial Asymmetry (Cranial nerve 7 motor function) (limited exam to video visit)          [x] No gaze palsy        [] Abnormal-         Skin:        [x] No significant exanthematous lesions or discoloration noted on facial skin         [] Abnormal-            Psychiatric:       [x] Normal Affect [] No Hallucinations        [] Abnormal-   Judgment, behavior, thought and mood are normal.       Diagnosis Orders   1. Acute cystitis without hematuria  POCT Urine with Microscopic   2. Flank pain  POCT Urine with Microscopic     Ottoniel Toribio was seen today for lower back pain, urinary frequency, weight loss and fever. Diagnoses and all orders for this visit:    Acute cystitis without hematuria  -     POCT Urine with Microscopic    Flank pain  -     POCT Urine with Microscopic    Other orders  -     sulfamethoxazole-trimethoprim (BACTRIM DS;SEPTRA DS) 800-160 MG per tablet;  Take 1 tablet by mouth 2 times daily for 7 days (During EBSYI-77 public health emergency), evaluation of the following organ systems was limited: Vitals/Constitutional/EENT/Resp/CV/GI//MS/Neuro/Skin/Heme-Lymph-Imm. Pursuant to the emergency declaration under the [address] [place] [address], [address] [place] [address] authority and the [employer] and [employer] Act, this Virtual Visit was conducted with patient's (and/or legal guardian's) consent, to reduce the patient's risk of exposure to COVID-19 and provide necessary medical care. The patient (and/or legal guardian) has also been advised to contact this office for worsening conditions or problems, and seek emergency medical treatment and/or call 911 if deemed necessary. Patient initiated the encounter and gave consent for the encounter. Services were provided through a video synchronous discussion virtually to substitute for in-person clinic visit. Patient and provider were located at their individual homes.

## 2021-03-03 NOTE — Clinical Note
I ordered a UA, so she will drop it off to the office, do the UA and add a culture if leukocytes thanks

## 2021-03-04 ENCOUNTER — TELEPHONE (OUTPATIENT)
Dept: FAMILY MEDICINE CLINIC | Age: 31
End: 2021-03-04

## 2021-03-04 NOTE — TELEPHONE ENCOUNTER
Asim Cabrera said she could not come in office to give urine sample today because she keeps throwing up and is dizzy. She has been taking Bactrim and taking it with food. Asim Cabrera said she is feeling better since taking it. Patient said she has eye drops prednisone 1 %. She is not sure if that could be causing her symptoms. Asim Cabrera may try to come into office tomorrow to give sample.

## 2021-03-11 ENCOUNTER — OFFICE VISIT (OUTPATIENT)
Dept: CARDIOLOGY CLINIC | Age: 31
End: 2021-03-11
Payer: COMMERCIAL

## 2021-03-11 VITALS
DIASTOLIC BLOOD PRESSURE: 70 MMHG | SYSTOLIC BLOOD PRESSURE: 100 MMHG | WEIGHT: 154 LBS | OXYGEN SATURATION: 97 % | BODY MASS INDEX: 25.66 KG/M2 | HEART RATE: 83 BPM | HEIGHT: 65 IN

## 2021-03-11 DIAGNOSIS — G90.A POTS (POSTURAL ORTHOSTATIC TACHYCARDIA SYNDROME): Primary | ICD-10-CM

## 2021-03-11 PROCEDURE — 99214 OFFICE O/P EST MOD 30 MIN: CPT | Performed by: INTERNAL MEDICINE

## 2021-03-11 PROCEDURE — G8484 FLU IMMUNIZE NO ADMIN: HCPCS | Performed by: INTERNAL MEDICINE

## 2021-03-11 PROCEDURE — G8427 DOCREV CUR MEDS BY ELIG CLIN: HCPCS | Performed by: INTERNAL MEDICINE

## 2021-03-11 PROCEDURE — G8419 CALC BMI OUT NRM PARAM NOF/U: HCPCS | Performed by: INTERNAL MEDICINE

## 2021-03-11 PROCEDURE — 1036F TOBACCO NON-USER: CPT | Performed by: INTERNAL MEDICINE

## 2021-03-11 PROCEDURE — 93000 ELECTROCARDIOGRAM COMPLETE: CPT | Performed by: INTERNAL MEDICINE

## 2021-03-11 RX ORDER — PROPRANOLOL HYDROCHLORIDE 120 MG/1
80 CAPSULE, EXTENDED RELEASE ORAL DAILY
Qty: 90 CAPSULE | Refills: 3 | Status: SHIPPED | OUTPATIENT
Start: 2021-03-11 | End: 2021-08-31

## 2021-03-11 NOTE — PATIENT INSTRUCTIONS
RECOMMENDATIONS:  1. Increase propranolol to 180mg daily. 2. Encourage activity as tolerated. 3. Recommend COVID vaccine as soon as it is available. 4. Follow up in 1 year.

## 2021-03-11 NOTE — PROGRESS NOTES
syncope. Past Medical History:   has a past medical history of Acid reflux, Endometriosis, Hypertension, POTS (postural orthostatic tachycardia syndrome), and Small fiber neuropathy. Past Surgical History:   has a past surgical history that includes [place] tooth extraction (); ovarian cyst removal (Right, 2014); Nasal septum surgery (2018);  section (2019); Breast cyst excision (Right, 2017); and Upper gastrointestinal endoscopy (N/A, 2020). Social History:   reports that she quit smoking about 6 years ago. She has never used smokeless tobacco. She reports previous alcohol use. She reports that she does not use drugs. Family History: family history includes Breast Cancer in her maternal grandmother; Diabetes in her paternal grandfather; Heart Attack in her paternal grandfather; Hypertension in her paternal grandfather; No Known Problems in her father and mother. Allergies:  Patient has no known allergies. Home Medications:    Prior to Admission medications    Medication Sig Start Date End Date Taking?  Authorizing Provider   propranolol (INDERAL LA) 120 MG extended release capsule Take 1 capsule by mouth daily  Patient taking differently: Take 80 mg by mouth daily 80 mg daily 3/11/21 4/10/21 Yes Vijay Tam MD   Cranberry-Vitamin C (AZO CRANBERRY URINARY TRACT) 250-60 MG CAPS Take by mouth   Yes Historical Provider, MD   cyanocobalamin 1000 MCG/ML injection Inject 1 mL into the muscle every 30 days 20  Yes Shamika Munguia DO   Magnesium 80 MG TABS Take by mouth   Yes Historical Provider, MD   albuterol sulfate  (90 Base) MCG/ACT inhaler Inhale 2 puffs into the lungs every 6 hours as needed 20  Yes Historical Provider, MD   Syringe/Needle, Disp, (SYRINGE 3CC/25GX5/8\") 25G X 5/8\" 3 ML MISC Use as directed to administer B12 once a month 20  Yes Shamika Munguia DO       REVIEW OF SYSTEMS:      All 14-point review of systems are completed and pertinent positives are mentioned in the history of present illness. Other systems are reviewed and are negative. Physical Examination:    /70   Pulse 83   Ht 5' 5\" (1.651 m)   Wt 154 lb (69.9 kg)   SpO2 97%   BMI 25.63 kg/m²    Constitutional and General Appearance: alert, cooperative, no distress and appears stated age  HEENT: PERRL, no cervical lymphadenopathy. No masses palpable. Normal oral mucosa  Respiratory:  · Normal excursion and expansion without use of accessory muscles  · Resp Auscultation: Normal breath sounds without dullness or wheezing  Cardiovascular:  · The apical impulse is not displaced  · Heart tones are crisp and normal. regular S1 and S2. Abdomen:  · No masses or tenderness  · Bowel sounds present  Extremities:  ·  No Cyanosis or Clubbing  ·  Lower extremity edema: No  · Skin: Warm and dry  Neurological:  · Alert and oriented. · Moves all extremities well  · No abnormalities of mood, affect, memory, mentation, or behavior are noted    DATA:    ECG 3/11/2021: SR 72 BPM.     ECHO: 6/10/2020   Summary   Normal left ventricular size and wall thickness. Normal left ventricular   systolic function with an estimate ejection fraction of 55%. There are no   regional wall motion abnormalities. Normal diastolic function. No significant valvular regurgitation or stenosis. IMPRESSION:    1. POTS (postural orthostatic tachycardia syndrome)  05/26/2020  Patient is a pleasant 18-year-old female with a history of tilt table diagnosed pots, and small fiber neuropathy who is being evaluated for symptomatic tachycardia with palpitations. Resting heart rate was around 60 however control into the 120s when she is mildly active. I am unclear at this point how symptomatic she is with her pots. She had a repeat tilt table test done by the [facility] which was negative for pots. He is having some volume overload complaints with swelling in her legs and her arms.   She is post pregnancy and so available. 4. Follow up in 1 year. QUALITY MEASURES  1. Tobacco Cessation Counseling: NA  2. Retake of BP if >140/90:   NA  3. Documentation to PCP/referring for new patient:  Sent to PCP at close of office visit  4. CAD patient on anti-platelet: NA  5. CAD patient on STATIN therapy:  NA  6. Patient with CHF and aFib on anticoagulation:  NA     All questions and concerns were addressed to the patient/family. Alternatives to my treatment were discussed. This note has been scribed in the presence of Vickey Blake MD by Andrez Bhat RN. Dr. Stephen De La O MD  Electrophysiology  [facility]. [address] [place] [address]. [address]. [place] [address]  Phone: [phone]  Fax: [phone]   3/11/2021     NOTE: This report was transcribed using voice recognition software. Every effort was made to ensure accuracy, however, inadvertent computerized transcription errors may be present. The scribe's documentation has been prepared under my direction and personally reviewed by me in its entirety. I confirm that the note above accurately reflects all work, physical examination, the discussion of treatments and procedures, and medical decision making performed by me. Verona Fabry, MD personally performed the services described in this documentation as scribed by nurse in my presence, and is both accurate and complete.     Electronically signed by Mahi Mejia MD on 3/11/2021 at 4:29 PM

## 2021-03-11 NOTE — LETTER
[address]   Electrophysiology Note      Date: 3/11/21  Patient Name: Jacquelyn Davies  YOB: 1990    Primary Care Physician: Zahida Carroll DO    CHIEF COMPLAINT:   Chief Complaint   Patient presents with    Follow-up     Discuss medication changes    Tachycardia     POTS       HISTORY OF PRESENT ILLNESS: Jacquelyn Davies is a 31 y.o. female who initally presented for follow up for POTS (2018). She has a PMH of low Vit B12 (that she gets B 12 injections), IBS, HTN,, and small fiber neuropathy. An Echocardiogram from 7/28/10 [facility]) demonstrated EF 55%. She stated she had a positive tilt table test.  She quit smoking 6 years ago. She does not use any illegal drugs and only drinks maybe twice a year. Echo on 6/10/2020 shows an EF of 55%. At her office visit on 9/3/20 she reported her SOB had improved but was still experiencing palpitations and dizziness with her anxiety. On 12/8/20, she reports she was doing well. She reported her propranolol every day in the morning and if she is having a lot of issues with her POTS that day and her HR, BP, and dizziness occur, she will take it again about 6 hours later. She felt that the propranolol has helped her POTS. She reported she had not had a migraine for 2 months. Dr Susy Flowers at [address] [place] [address] is her neurologist. He wanted to double check with our office to see if increasing her propranolol would be an option. Interval History Since Last Office Visit: Today, 3/11/2021, patient's ECG demonstrates SR 72 BPM. She reports she is feeling well. She has been exercising and has been losing weight. She reports she tolerants activity well. She reports the propranolol has been helpful to her migraines. She reports that she continues to have no migraines, and no POTS symptoms other than perspiring occasionally. She is taking all medications as prescribed and tolerates them well.  Patient denies current edema, chest pain, sob, palpitations, dizziness or syncope. Past Medical History:   has a past medical history of Acid reflux, Endometriosis, Hypertension, POTS (postural orthostatic tachycardia syndrome), and Small fiber neuropathy. Past Surgical History:   has a past surgical history that includes [place] tooth extraction (); ovarian cyst removal (Right, 2014); Nasal septum surgery (2018);  section (2019); Breast cyst excision (Right, 2017); and Upper gastrointestinal endoscopy (N/A, 2020). Social History:   reports that she quit smoking about 6 years ago. She has never used smokeless tobacco. She reports previous alcohol use. She reports that she does not use drugs. Family History: family history includes Breast Cancer in her maternal grandmother; Diabetes in her paternal grandfather; Heart Attack in her paternal grandfather; Hypertension in her paternal grandfather; No Known Problems in her father and mother. Allergies:  Patient has no known allergies. Home Medications:    Prior to Admission medications    Medication Sig Start Date End Date Taking?  Authorizing Provider   propranolol (INDERAL LA) 120 MG extended release capsule Take 1 capsule by mouth daily  Patient taking differently: Take 80 mg by mouth daily 80 mg daily 3/11/21 4/10/21 Yes Leone Kayser., MD   Cranberry-Vitamin C (AZO CRANBERRY URINARY TRACT) 250-60 MG CAPS Take by mouth   Yes Historical Provider, MD   cyanocobalamin 1000 MCG/ML injection Inject 1 mL into the muscle every 30 days 20  Yes Marisela Hawk DO   Magnesium 80 MG TABS Take by mouth   Yes Historical Provider, MD   albuterol sulfate  (90 Base) MCG/ACT inhaler Inhale 2 puffs into the lungs every 6 hours as needed 20  Yes Historical Provider, MD   Syringe/Needle, Disp, (SYRINGE 3CC/25GX5/8\") 25G X 5/8\" 3 ML MISC Use as directed to administer B12 once a month 20  Yes Marisela Hawk DO       REVIEW OF SYSTEMS:      All 14-point review of systems are completed and pertinent positives are mentioned in the history of present illness. Other systems are reviewed and are negative. Physical Examination:    /70   Pulse 83   Ht 5' 5\" (1.651 m)   Wt 154 lb (69.9 kg)   SpO2 97%   BMI 25.63 kg/m²    Constitutional and General Appearance: alert, cooperative, no distress and appears stated age  HEENT: PERRL, no cervical lymphadenopathy. No masses palpable. Normal oral mucosa  Respiratory:  · Normal excursion and expansion without use of accessory muscles  · Resp Auscultation: Normal breath sounds without dullness or wheezing  Cardiovascular:  · The apical impulse is not displaced  · Heart tones are crisp and normal. regular S1 and S2. Abdomen:  · No masses or tenderness  · Bowel sounds present  Extremities:  ·  No Cyanosis or Clubbing  ·  Lower extremity edema: No  · Skin: Warm and dry  Neurological:  · Alert and oriented. · Moves all extremities well  · No abnormalities of mood, affect, memory, mentation, or behavior are noted    DATA:    ECG 3/11/2021: SR 72 BPM.     ECHO: 6/10/2020   Summary   Normal left ventricular size and wall thickness. Normal left ventricular   systolic function with an estimate ejection fraction of 55%. There are no   regional wall motion abnormalities. Normal diastolic function. No significant valvular regurgitation or stenosis. IMPRESSION:    1. POTS (postural orthostatic tachycardia syndrome)  05/26/2020  Patient is a pleasant 26-year-old female with a history of tilt table diagnosed pots, and small fiber neuropathy who is being evaluated for symptomatic tachycardia with palpitations. Resting heart rate was around 60 however control into the 120s when she is mildly active. I am unclear at this point how symptomatic she is with her pots. She had a repeat tilt table test done by the [facility] which was negative for pots. He is having some volume overload complaints with swelling in her legs and her arms.   She is post pregnancy and so I am concerned for post partum cardiomyopathy. I would like to start with an ultrasound and some lab work to see/make sure no other potential physiology occurring that may explain her symptoms. After her ultrasound and lab work we will consider management of her pots which will include increase fluid intake, increase salt intake, and possibly augmentation with medication such as fludrocortisone or midodrine. We will follow-up with her for her labs. 07/02/2020  Labs reassuring. Echocardiogram reassuring. Patient having symptoms without air conditioning. Once restored then will let us know how she is doing.  - Follow up in 6-8 weeks. - Let us know how doing with restoration of air conditioning.     3/11/2021  Patient presents for follow up. Her symptoms have improved significant on propanolol. Her headaches have also improved. Her POTS symptoms have also improved. We discussed increasing her propanolol to see if will help her symptoms and headaches. If any adverse changes we will go back to 80 mg of her propanolol.  - Increase propanolol to 120 mg daily.  - Follow up with EP NP in 1 year unless/until procedure/discussion required or PRN. 2. Shortness of breath. 05/26/2020  Etiology unclear. Occurs with exertion. May be sinus tachycardia, inappropriate sinus tachycardia, or postural orthostatic tachycardia. I suspect it is most likely related to deconditioning, however we will work-up given the severity of her symptoms. 03/11/2021  Improved. - Plan as per above.     3. Swelling. 05/26/2020  Etiology unclear. May be related to prednisone however she states that temporarily it started well before she got started on her prednisone. Plan as per above.    03/11/2021  Resolved with propanolol.  - Plan as per above. Ok to get COVID19 vaccine.      RECOMMENDATIONS:  1. Increase propranolol to 180mg daily. 2. Encourage activity as tolerated.    3. Recommend COVID vaccine as soon as it is available. 4. Follow up in 1 year. QUALITY MEASURES  1. Tobacco Cessation Counseling: NA  2. Retake of BP if >140/90:   NA  3. Documentation to PCP/referring for new patient:  Sent to PCP at close of office visit  4. CAD patient on anti-platelet: NA  5. CAD patient on STATIN therapy:  NA  6. Patient with CHF and aFib on anticoagulation:  NA     All questions and concerns were addressed to the patient/family. Alternatives to my treatment were discussed. This note has been scribed in the presence of Rayray Fong MD by Lety Alexander RN. Dr. Binu Parks MD  Electrophysiology  [facility]. [address]. [address]. [place], [address]  Phone: [phone]  Fax: [phone]   3/11/2021     NOTE: This report was transcribed using voice recognition software. Every effort was made to ensure accuracy, however, inadvertent computerized transcription errors may be present. The scribe's documentation has been prepared under my direction and personally reviewed by me in its entirety. I confirm that the note above accurately reflects all work, physical examination, the discussion of treatments and procedures, and medical decision making performed by me. Maria Teresa Paula MD personally performed the services described in this documentation as scribed by nurse in my presence, and is both accurate and complete.     Electronically signed by Meghana Pineda MD on 3/11/2021 at 4:29 PM

## 2021-04-02 ENCOUNTER — IMMUNIZATION (OUTPATIENT)
Dept: PRIMARY CARE CLINIC | Age: 31
End: 2021-04-02
Payer: COMMERCIAL

## 2021-04-02 PROCEDURE — 91300 COVID-19, PFIZER VACCINE 30MCG/0.3ML DOSE: CPT | Performed by: FAMILY MEDICINE

## 2021-04-02 PROCEDURE — 0001A COVID-19, PFIZER VACCINE 30MCG/0.3ML DOSE: CPT | Performed by: FAMILY MEDICINE

## 2021-04-06 ENCOUNTER — NURSE TRIAGE (OUTPATIENT)
Dept: OTHER | Facility: CLINIC | Age: 31
End: 2021-04-06

## 2021-04-06 ENCOUNTER — VIRTUAL VISIT (OUTPATIENT)
Dept: FAMILY MEDICINE CLINIC | Age: 31
End: 2021-04-06
Payer: COMMERCIAL

## 2021-04-06 DIAGNOSIS — L60.0 INGROWN RIGHT GREATER TOENAIL: Primary | ICD-10-CM

## 2021-04-06 PROCEDURE — G8428 CUR MEDS NOT DOCUMENT: HCPCS | Performed by: FAMILY MEDICINE

## 2021-04-06 PROCEDURE — 99213 OFFICE O/P EST LOW 20 MIN: CPT | Performed by: FAMILY MEDICINE

## 2021-04-06 NOTE — TELEPHONE ENCOUNTER
Reason for Disposition   Entire toe is swollen    Answer Assessment - Initial Assessment Questions  1. LOCATION: \"Which toe? \"     Right big toe    2. APPEARANCE: \"What does it look like? \"     Red swollen    3. ONSET: \"When did it start? \"   Last night    4. PAIN: \"Is there any pain? \" If so, ask: \"How bad is the pain? \"   (Scale 1-10; or mild, moderate, severe)    Pain=8    5. REDNESS: \"Is there any redness of the skin? \" If so, ask: \"How much of the toe is red? \"   yes, tip of toe    6. OTHER SYMPTOMS: \"Do you have any other symptoms? \" (e.g., fever, shaking, chills, red streak up foot)  Chills    7. PREGNANCY: \"Is there any chance you are pregnant? \" \"When was your last menstrual period? \"  No, 1 month ago    Protocols used: TOENAIL - INGROWN-ADULT-    Received call from Vlad Orozco at pre-service [facility] with Red Flag Complaint. Brief description of triage: ingrown toenail    Triage indicates for patient to be seenin next 24 hours    Care advice provided, patient verbalizes understanding; denies any other questions or concerns; instructed to call back for any new or worsening symptoms. Writer provided warm transfer to Danitza Cruz at [facility] for appointment scheduling. Attention Provider: Thank you for allowing me to participate in the care of your patient. The patient was connected to triage in response to information provided to the [facility]. Please do not respond through this encounter as the response is not directed to a shared pool.

## 2021-04-06 NOTE — PROGRESS NOTES
TELEHEALTH EVALUATION -- Audio/Visual (During ITXKO-40 public health emergency)    HPI:  The patient has requested an audio/video evaluation for the following concern(s):      Chief Complaint   Patient presents with    Toe Pain   She was sent through triage this morning for infected toenail. BP Readings from Last 5 Encounters:   03/11/21 100/70   12/08/20 139/79   07/02/20 (!) 129/91   06/04/20 119/79   06/04/20 137/85       Review of Systems   As above  Allergic/Immunologic: Negative for immunocompromised state. Psychiatric/Behavioral: Negative for agitation, behavioral problems and confusion. Physical Exam    Constitutional: [x] Appears well-developed and well-nourished [x] No apparent distress      [] Abnormal-   Mental status  [x] Alert and awake  [x] Oriented to person/place/time [x]Able to follow commands      Eyes:  EOM    [x]  Normal  [] Abnormal-  Sclera  [x]  Normal  [] Abnormal -         Discharge [x]  None visible  [] Abnormal -    HENT:   [x] Normocephalic, atraumatic. [] Abnormal   [] Mouth/Throat: Mucous membranes are moist.     External Ears [x] Normal  [] Abnormal-     Neck: [x] No visualized mass     Pulmonary/Chest: [x] Respiratory effort normal.  [x] No visualized signs of difficulty breathing or respiratory distress        [] Abnormal-    Able to speak in full sentences without difficulty  Musculoskeletal:   [] Normal gait with no signs of ataxia         [x] Normal range of motion of neck        [] Abnormal-       Neurological:        [x] No Facial Asymmetry (Cranial nerve 7 motor function) (limited exam to video visit)          [x] No gaze palsy        [] Abnormal-         Skin:        [x] No significant exanthematous lesions or discoloration noted on facial skin         [] Abnormal-            Psychiatric:       [x] Normal Affect [] No Hallucinations        [] Abnormal-   Judgment, behavior, thought and mood are normal.       Diagnosis Orders   1.  Ingrown right greater toenail  Amb External Referral To Lydia Lagos was seen today for toe pain. Diagnoses and all orders for this visit:    Ingrown right greater toenail  -     Amb External Referral To Podiatry    She will take Zithromax. She took 1 pill at 1am, I recommend today she take 2 pills, then 1 pill day 3-5. Soak feet. .     (During XRSTI-20 public health emergency), evaluation of the following organ systems was limited: Vitals/Constitutional/EENT/Resp/CV/GI//MS/Neuro/Skin/Heme-Lymph-Imm. Pursuant to the emergency declaration under the [facility], [address] [place] [address] authority and the [employer] Act, this Virtual Visit was conducted with patient's (and/or legal guardian's) consent, to reduce the patient's risk of exposure to COVID-19 and provide necessary medical care. The patient (and/or legal guardian) has also been advised to contact this office for worsening conditions or problems, and seek emergency medical treatment and/or call 911 if deemed necessary. Patient initiated the encounter and gave consent for the encounter. Services were provided through a video synchronous discussion virtually to substitute for in-person clinic visit. Patient and provider were located at their individual homes.     30 minute time spent  Time spent today included for this patient visit includes time spent preparing to see the patient  Including review of tests, labs and imaging,   revewing previous history and recent encounters,   obtaining and/or reviewing separately obtained history in care everywhere or record,   performing a medically appropriate examination and/or evaluation;   counseling and educating the patient and /family/caregiver when present,  ordering medications, tests, or procedures;   referring to other health care specialists (when not separately reported);   documenting clinical information in the electronic health record; independently interpreting results (not separately reported)   and communicating results to the patient.

## 2021-04-26 ENCOUNTER — TELEPHONE (OUTPATIENT)
Dept: CARDIOLOGY CLINIC | Age: 31
End: 2021-04-26

## 2021-04-26 ENCOUNTER — IMMUNIZATION (OUTPATIENT)
Dept: PRIMARY CARE CLINIC | Age: 31
End: 2021-04-26
Payer: COMMERCIAL

## 2021-04-26 PROCEDURE — 0002A COVID-19, PFIZER VACCINE 30MCG/0.3ML DOSE: CPT | Performed by: FAMILY MEDICINE

## 2021-04-26 PROCEDURE — 91300 COVID-19, PFIZER VACCINE 30MCG/0.3ML DOSE: CPT | Performed by: FAMILY MEDICINE

## 2021-04-26 NOTE — TELEPHONE ENCOUNTER
Pt sts Dr Kayleigh Cottrell wants her to start Rebecca Delatorre for hormones. Is this ok with [facility]. Please advise.

## 2021-05-20 RX ORDER — CYANOCOBALAMIN 1000 UG/ML
1000 INJECTION INTRAMUSCULAR; SUBCUTANEOUS
Qty: 3 ML | Refills: 1 | Status: SHIPPED | OUTPATIENT
Start: 2021-05-20 | End: 2021-11-23

## 2021-05-20 NOTE — TELEPHONE ENCOUNTER
Future Appointments   Date Time Provider Evangelista Khalil   6/10/2021 11:30 AM Becki Mireles DO [address] ENT [facility]   6/21/2021  2:45 PM Eduardo Lane MD AND GASTRO [facility] 4/6/2021

## 2021-06-10 ENCOUNTER — OFFICE VISIT (OUTPATIENT)
Dept: ENT CLINIC | Age: 31
End: 2021-06-10
Payer: COMMERCIAL

## 2021-06-10 VITALS
HEART RATE: 59 BPM | BODY MASS INDEX: 27.29 KG/M2 | TEMPERATURE: 98.3 F | SYSTOLIC BLOOD PRESSURE: 99 MMHG | HEIGHT: 65 IN | DIASTOLIC BLOOD PRESSURE: 64 MMHG | WEIGHT: 163.8 LBS

## 2021-06-10 DIAGNOSIS — J34.89 RHINORRHEA: Primary | ICD-10-CM

## 2021-06-10 DIAGNOSIS — G43.909 MIGRAINE WITHOUT STATUS MIGRAINOSUS, NOT INTRACTABLE, UNSPECIFIED MIGRAINE TYPE: ICD-10-CM

## 2021-06-10 PROCEDURE — 31231 NASAL ENDOSCOPY DX: CPT | Performed by: OTOLARYNGOLOGY

## 2021-06-10 PROCEDURE — G8427 DOCREV CUR MEDS BY ELIG CLIN: HCPCS | Performed by: OTOLARYNGOLOGY

## 2021-06-10 PROCEDURE — G8419 CALC BMI OUT NRM PARAM NOF/U: HCPCS | Performed by: OTOLARYNGOLOGY

## 2021-06-10 PROCEDURE — 1036F TOBACCO NON-USER: CPT | Performed by: OTOLARYNGOLOGY

## 2021-06-10 PROCEDURE — 99203 OFFICE O/P NEW LOW 30 MIN: CPT | Performed by: OTOLARYNGOLOGY

## 2021-06-10 ASSESSMENT — ENCOUNTER SYMPTOMS
APNEA: 0
SORE THROAT: 0
TROUBLE SWALLOWING: 0
SHORTNESS OF BREATH: 0
EYE ITCHING: 0
SINUS PRESSURE: 0
FACIAL SWELLING: 0
VOICE CHANGE: 0
COUGH: 0
RHINORRHEA: 1

## 2021-06-10 NOTE — PROGRESS NOTES
[address], [address] [place] [address]  P: [phone]       Patient     Natan Hutchison  1990    ChiefComplaint     Chief Complaint   Patient presents with    Sinus Problem     Had sinus sx in 2018 (balloon dialation, and septo plasty) since then has issues with drainage when bending over, clear fluid. History of Present Illness     Elaina Gamboa is a 69-year-old female here today for evaluation of anterior clear rhinorrhea. She reports in 2018 having balloon dilation performed for persistent sphenoid sinus infection. Since that time she has had clear rhinorrhea. She reports that during the an office procedure she heard a loud crack when he was in the right frontal sinus reports some seeming concerned and since that time she has had clear rhinorrhea. Has lifetime of migraines but no significant change in symptoms. Has not tried any treatment for the above. Had CT of the head performed in 2020 which was negative for any sinus disease.     Past Medical History     Past Medical History:   Diagnosis Date    Acid reflux     Endometriosis 2014    Hypertension     POTS (postural orthostatic tachycardia syndrome)     Small fiber neuropathy        Past Surgical History     Past Surgical History:   Procedure Laterality Date    BREAST CYST EXCISION Right 2017     SECTION  2019    Uterine extension    NASAL SEPTUM SURGERY  2018    OVARIAN CYST REMOVAL Right 2014    SINUS SURGERY      UPPER GASTROINTESTINAL ENDOSCOPY N/A 2020    EGD BIOPSY performed by Dmitri Williamson MD at [address] [place] [address]         Family History     Family History   Problem Relation Age of Onset    Breast Cancer Maternal Grandmother     Heart Attack Paternal Grandfather     Hypertension Paternal Grandfather     Diabetes Paternal Grandfather     No Known Problems Mother     No Known Problems Father        Social decreased concentration and sleep disturbance. PhysicalExam     Vitals:    06/10/21 1150   BP: 99/64   Site: Left Upper Arm   Position: Sitting   Cuff Size: Large Adult   Pulse: 59   Temp: 98.3 °F (36.8 °C)   TempSrc: Oral   Weight: 163 lb 12.8 oz (74.3 kg)   Height: 5' 5\" (1.651 m)       Physical Exam  Constitutional:       General: She is not in acute distress. Appearance: She is well-developed. HENT:      Head: Normocephalic and atraumatic. Right Ear: Tympanic membrane, ear canal and external ear normal. No drainage. No middle ear effusion. Tympanic membrane is not bulging. Tympanic membrane has normal mobility. Left Ear: Tympanic membrane, ear canal and external ear normal. No drainage. No middle ear effusion. Tympanic membrane is not bulging. Tympanic membrane has normal mobility. Nose: No septal deviation, mucosal edema or rhinorrhea. Mouth/Throat:      Lips: Pink. Mouth: Mucous membranes are moist.      Tongue: No lesions. Palate: No mass. Pharynx: Uvula midline. Tonsils: No tonsillar exudate. 1+ on the right. 1+ on the left. Eyes:      Pupils: Pupils are equal, round, and reactive to light. Neck:      Thyroid: No thyroid mass or thyromegaly. Trachea: Trachea and phonation normal.   Cardiovascular:      Pulses: Normal pulses. Pulmonary:      Effort: Pulmonary effort is normal. No accessory muscle usage or respiratory distress. Breath sounds: No stridor. Musculoskeletal:      Cervical back: Full passive range of motion without pain. Lymphadenopathy:      Head:      Right side of head: No submental or submandibular adenopathy. Left side of head: No submental or submandibular adenopathy. Cervical: No cervical adenopathy. Right cervical: No superficial, deep or posterior cervical adenopathy. Left cervical: No superficial, deep or posterior cervical adenopathy. Skin:     General: Skin is warm and dry.    Neurological:

## 2021-06-25 ENCOUNTER — HOSPITAL ENCOUNTER (OUTPATIENT)
Age: 31
Discharge: HOME | End: 2021-06-25
Payer: COMMERCIAL

## 2021-06-25 ENCOUNTER — OFFICE VISIT (OUTPATIENT)
Dept: GASTROENTEROLOGY | Age: 31
End: 2021-06-25
Payer: COMMERCIAL

## 2021-06-25 VITALS
SYSTOLIC BLOOD PRESSURE: 116 MMHG | HEIGHT: 65 IN | DIASTOLIC BLOOD PRESSURE: 70 MMHG | BODY MASS INDEX: 27.49 KG/M2 | WEIGHT: 165 LBS

## 2021-06-25 DIAGNOSIS — J34.89 RHINORRHEA: ICD-10-CM

## 2021-06-25 DIAGNOSIS — R19.7 DIARRHEA, UNSPECIFIED TYPE: ICD-10-CM

## 2021-06-25 DIAGNOSIS — R10.32 LLQ PAIN: ICD-10-CM

## 2021-06-25 DIAGNOSIS — K59.00 CONSTIPATION, UNSPECIFIED CONSTIPATION TYPE: Primary | ICD-10-CM

## 2021-06-25 PROCEDURE — 1036F TOBACCO NON-USER: CPT | Performed by: INTERNAL MEDICINE

## 2021-06-25 PROCEDURE — G8419 CALC BMI OUT NRM PARAM NOF/U: HCPCS | Performed by: INTERNAL MEDICINE

## 2021-06-25 PROCEDURE — 86335 IMMUNFIX E-PHORSIS/URINE/CSF: CPT

## 2021-06-25 PROCEDURE — 99214 OFFICE O/P EST MOD 30 MIN: CPT | Performed by: INTERNAL MEDICINE

## 2021-06-25 PROCEDURE — G8427 DOCREV CUR MEDS BY ELIG CLIN: HCPCS | Performed by: INTERNAL MEDICINE

## 2021-06-25 NOTE — PROGRESS NOTES
Food in the Last Year:    [address] in the Last Year:    Transportation Needs:     Lack of Transportation (Medical):      Lack of Transportation (Non-Medical):    Physical Activity:     Days of Exercise per Week:     Minutes of Exercise per Session:    Stress:     Feeling of Stress :    Social Connections:     Frequency of Communication with Friends and Family:     Frequency of Social Gatherings with Friends and Family:     Attends [employer] Services:     Active Member of Clubs or Organizations:     Attends Club or Organization Meetings:     Marital Status:    Intimate Partner Violence:     Fear of Current or Ex-Partner:     Emotionally Abused:     Physically Abused:     Sexually Abused:      SURGICAL HISTORY     Past Surgical History:   Procedure Laterality Date    BREAST CYST EXCISION Right 2017     SECTION  2019    Uterine extension    NASAL SEPTUM SURGERY  2018    OVARIAN CYST REMOVAL Right 2014    SINUS SURGERY      UPPER GASTROINTESTINAL ENDOSCOPY N/A 2020    EGD BIOPSY performed by Rey Rodriguez MD at [address]       CURRENT MEDICATIONS   (This list may include medications prescribed during this encounter as epic can not insert only the list prior to this encounter.)  Current Outpatient Rx   Medication Sig Dispense Refill    cyanocobalamin 1000 MCG/ML injection INJECT 1 ML INTO THE MUSCLE EVERY 30 DAYS 3 mL 1    Cranberry-Vitamin C (AZO CRANBERRY URINARY TRACT) 250-60 MG CAPS Take by mouth      Magnesium 80 MG TABS Take by mouth      albuterol sulfate  (90 Base) MCG/ACT inhaler Inhale 2 puffs into the lungs every 6 hours as needed      Syringe/Needle, Disp, (SYRINGE 3CC/25GX5/8\") 25G X 5/8\" 3 ML MISC Use as directed to administer B12 once a month 1 each 11    propranolol (INDERAL LA) 120 MG extended release capsule Take 1 capsule by mouth daily (Patient taking differently: Take 80 mg by mouth daily 80 mg daily) 90 capsule 3       ALLERGIES   No Known Allergies    REVIEW OF SYSTEMS   No chest pain suspicious for cardiac origin  No SOB    PHYSICAL EXAM   VITAL SIGNS: /70   Ht 5' 5\" (1.651 m)   Wt 165 lb (74.8 kg)   BMI 27.46 kg/m²   Wt Readings from Last 1 Encounters:   06/25/21 165 lb (74.8 kg)       FINAL IMPRESSION AND RECOMMENDATIONS     IBS-M is a likely diagnosis. Postural hypotension due to autonomic neuropathy    The total time spent face-to-face during this visit and before and after the visit was  30 minutes with more than 50% of the time spent in reviewing the electronic chart which showed extensive w/u for chronic diarrhea to be -ve, coordination of care for setting up colonoscopy and counseling the patient about risk benefits, prognosis and alternatives to colon. After the discussion we decided to hold off colonoscopy due to my suspicion that she may have high fecal load as the underlying problem and this can be assessed by a plain abd X-ray. The patient was counseled about eliminating fructose and gluten in the diet and avoiding green leafy veg and salads for now. F/U in one month. Sebastian Mcmahon MD 6/25/21 1:40 PM EDT    CC:  Nicolette Underwood, DO      IMPORTANT: Please note that some portions of this note may have been created using Dragon voice recognition software. Some \"sound-alike\" and totally wrong word substitutions may have taken place due to known inherent limitations of any such software, including this voice recognition software. In spite of efforts to eliminate such errors, some may not have been corrected. So please read the note with this in mind and recognize such mistakes and understand the correct version using the  context. Thanks.

## 2021-06-28 LAB — BETA-2 TRANSFERRIN: NOT DETECTED

## 2021-06-29 ENCOUNTER — TELEPHONE (OUTPATIENT)
Dept: ENT CLINIC | Age: 31
End: 2021-06-29

## 2021-06-29 ENCOUNTER — TELEPHONE (OUTPATIENT)
Dept: GASTROENTEROLOGY | Age: 31
End: 2021-06-29

## 2021-06-29 DIAGNOSIS — J30.0 VASOMOTOR RHINITIS: Primary | ICD-10-CM

## 2021-06-29 RX ORDER — IPRATROPIUM BROMIDE 21 UG/1
2 SPRAY, METERED NASAL 4 TIMES DAILY
Qty: 1 BOTTLE | Refills: 3 | Status: SHIPPED | OUTPATIENT
Start: 2021-06-29 | End: 2021-08-09 | Stop reason: ALTCHOICE

## 2021-06-29 NOTE — TELEPHONE ENCOUNTER
Spoke with Rd Funk regarding results. No evidence of CSF rhinorrhea. Symptoms consistent with vasomotor rhinitis. Condition explained. Recommend trial of Atrovent with follow-up in 1 month.

## 2021-06-29 NOTE — TELEPHONE ENCOUNTER
Rd Funk called the office to complain about not having the ability to schedule an x-ray. She became very upset with the  staff and began to curse. Nasima Gottlieb, ENT reg, transferred Rd Funk to me. I explained that x-rays do not require an appointment which makes it actually more convenient. Rd Funk persisted that this was not convenient and when she lived in [place] they took appointments. I called [facility] and their hours are mon-fri 7a-5p. Rd Funk went on to say that if she can't bring her toddler then she may not be able to get this done. I told her that hopefully someone can help her in the future. I apologized again for the situation.

## 2021-07-08 ENCOUNTER — HOSPITAL ENCOUNTER (OUTPATIENT)
Age: 31
Discharge: HOME | End: 2021-07-08
Payer: COMMERCIAL

## 2021-07-08 ENCOUNTER — HOSPITAL ENCOUNTER (OUTPATIENT)
Dept: GENERAL RADIOLOGY | Age: 31
Discharge: HOME | End: 2021-07-08
Payer: COMMERCIAL

## 2021-07-08 DIAGNOSIS — K59.00 CONSTIPATION, UNSPECIFIED CONSTIPATION TYPE: ICD-10-CM

## 2021-07-08 PROCEDURE — 74019 RADEX ABDOMEN 2 VIEWS: CPT

## 2021-07-19 ENCOUNTER — TELEPHONE (OUTPATIENT)
Dept: CARDIOLOGY CLINIC | Age: 31
End: 2021-07-19

## 2021-07-19 NOTE — TELEPHONE ENCOUNTER
[address] [place],[address] please advise. Pt's last OV 3/11/21. Pt can be reached at [phone].       TY

## 2021-07-19 NOTE — TELEPHONE ENCOUNTER
Pt is taking 120 mg of Propranolol. Lately her POTS syndrome has been acting up. Pt wanted to know if she can take an extra 40 mg of Propranolol when this happens. She has left over 40 mg tabs from when [address],[address] prescribed this dose before. Please advise.

## 2021-07-29 ENCOUNTER — TELEPHONE (OUTPATIENT)
Dept: GASTROENTEROLOGY | Age: 31
End: 2021-07-29

## 2021-08-02 ENCOUNTER — TELEPHONE (OUTPATIENT)
Dept: CARDIOLOGY CLINIC | Age: 31
End: 2021-08-02

## 2021-08-02 NOTE — TELEPHONE ENCOUNTER
Patient saw a GI doc and needs to take a large dose of Miralx to hopefully break up stool. She is asking if this is okay with her current medications. Also she is asking if [facility] [place],[address] would know how she would go about getting a \"medical card\"  That explains her condition. She states no one seems to know mucg about it.

## 2021-08-04 NOTE — TELEPHONE ENCOUNTER
Informed patient that ok to take propranolol with miralax per pharmacist.   Recommended for patient to make a card for her wallet with all diagnosis and meds and have it laminated. She states there is a specific medical card for POTS.  Do you have any insight on this, AGK?

## 2021-08-04 NOTE — TELEPHONE ENCOUNTER
Pt was calling again regarding message above. She really needs to know if she can take Miralax with the Propranolol. Pt is having a really hard time passing stool. She mentioned she thew up in her sleep last night. (unsure if this is related to not being able to go to the restroom). Also please advise on if pt can be provided a medical card that explains her POTS condition.

## 2021-08-05 ENCOUNTER — TELEPHONE (OUTPATIENT)
Dept: FAMILY MEDICINE CLINIC | Age: 31
End: 2021-08-05

## 2021-08-05 NOTE — TELEPHONE ENCOUNTER
----- Message from Jesse Fong sent at 8/5/2021 12:36 PM EDT -----  Subject: Appointment Request    Reason for Call: Semi-Routine Cough, Cold Symptoms    QUESTIONS  Type of Appointment? Established Patient  Reason for appointment request? No appointments available during search  Additional Information for Provider? Pt has a sore throat; would like to   schedule a virtual visit but no dates were available in the short term. ---------------------------------------------------------------------------  --------------  Brandon [employer] JYOTI  What is the best way for the office to contact you? OK to leave message on   voicemail  Preferred Call Back Phone Number? [phone]  ---------------------------------------------------------------------------  --------------  SCRIPT ANSWERS  Relationship to Patient? Self  Are you currently unable to finish sentences due to any difficulty   breathing? No  Are you unable to swallow liquids? No  Are you having fevers (100.4 or greater), chills, or sweats? No  Do you have COPD, asthma or a chronic lung condition? No  Have your symptoms been present for more than 5 days? No  Have you recently (14 days) been seen by a provider for this issue? No  Have you been diagnosed with, awaiting test results for, or told that you   are suspected of having COVID-19 (Coronavirus)? (If patient has tested   negative or was tested as a requirement for work, school, or travel and   not based on symptoms, answer no)? No  Do you currently have flu-like symptoms including fever or chills, cough,   shortness of breath, difficulty breathing, or new loss of taste or smell? No  Have you had close contact with someone with COVID-19 in the last 14 days? No  (Service Expert  click yes below to proceed with [employer] ([employer]) As Usual   Scheduling)?  Yes

## 2021-08-05 NOTE — TELEPHONE ENCOUNTER
I don't know of any cards as such. Most people have a medication card that also has their diagnosis on them.

## 2021-08-05 NOTE — TELEPHONE ENCOUNTER
LVM for patient. There is no \"medical card\" for POTS that we or anyone in the office is aware of. I searched online and there are cards with information about POTS that are available for purchase on [employer]. . not sure if this is what she is looking for.

## 2021-08-08 LAB
A/G RATIO: 1.6 (ref 1.1–2.2)
ALBUMIN SERPL-MCNC: 4.6 G/DL (ref 3.4–5)
ALP BLD-CCNC: 44 U/L (ref 40–129)
ALT SERPL-CCNC: 7 U/L (ref 10–40)
ANION GAP SERPL CALCULATED.3IONS-SCNC: 11 MMOL/L (ref 3–16)
AST SERPL-CCNC: 15 U/L (ref 15–37)
BASOPHILS ABSOLUTE: 0 K/UL (ref 0–0.2)
BASOPHILS RELATIVE PERCENT: 0.2 %
BILIRUB SERPL-MCNC: 0.8 MG/DL (ref 0–1)
BUN BLDV-MCNC: 12 MG/DL (ref 7–20)
CALCIUM SERPL-MCNC: 9.4 MG/DL (ref 8.3–10.6)
CHLORIDE BLD-SCNC: 101 MMOL/L (ref 99–110)
CO2: 17 MMOL/L (ref 21–32)
CREAT SERPL-MCNC: 0.7 MG/DL (ref 0.6–1.1)
EOSINOPHILS ABSOLUTE: 0.1 K/UL (ref 0–0.6)
EOSINOPHILS RELATIVE PERCENT: 0.7 %
GFR AFRICAN AMERICAN: >60
GFR NON-AFRICAN AMERICAN: >60
GLOBULIN: 2.9 G/DL
GLUCOSE BLD-MCNC: 114 MG/DL (ref 70–99)
HCG QUALITATIVE: NEGATIVE
HCT VFR BLD CALC: 43.3 % (ref 36–48)
HEMOGLOBIN: 15.2 G/DL (ref 12–16)
LYMPHOCYTES ABSOLUTE: 1.4 K/UL (ref 1–5.1)
LYMPHOCYTES RELATIVE PERCENT: 16.8 %
MCH RBC QN AUTO: 30.8 PG (ref 26–34)
MCHC RBC AUTO-ENTMCNC: 35.2 G/DL (ref 31–36)
MCV RBC AUTO: 87.6 FL (ref 80–100)
MONOCYTES ABSOLUTE: 0.7 K/UL (ref 0–1.3)
MONOCYTES RELATIVE PERCENT: 8.8 %
NEUTROPHILS ABSOLUTE: 5.9 K/UL (ref 1.7–7.7)
NEUTROPHILS RELATIVE PERCENT: 73.5 %
PDW BLD-RTO: 13 % (ref 12.4–15.4)
PLATELET # BLD: 228 K/UL (ref 135–450)
PMV BLD AUTO: 8.8 FL (ref 5–10.5)
POTASSIUM REFLEX MAGNESIUM: 4.1 MMOL/L (ref 3.5–5.1)
RBC # BLD: 4.95 M/UL (ref 4–5.2)
SODIUM BLD-SCNC: 129 MMOL/L (ref 136–145)
TOTAL PROTEIN: 7.5 G/DL (ref 6.4–8.2)
WBC # BLD: 8 K/UL (ref 4–11)

## 2021-08-08 PROCEDURE — 85025 COMPLETE CBC W/AUTO DIFF WBC: CPT

## 2021-08-08 PROCEDURE — 80053 COMPREHEN METABOLIC PANEL: CPT

## 2021-08-08 PROCEDURE — 84703 CHORIONIC GONADOTROPIN ASSAY: CPT

## 2021-08-08 PROCEDURE — 96374 THER/PROPH/DIAG INJ IV PUSH: CPT

## 2021-08-08 PROCEDURE — 83690 ASSAY OF LIPASE: CPT

## 2021-08-08 PROCEDURE — 99284 EMERGENCY DEPT VISIT MOD MDM: CPT

## 2021-08-08 PROCEDURE — 96361 HYDRATE IV INFUSION ADD-ON: CPT

## 2021-08-08 PROCEDURE — 36415 COLL VENOUS BLD VENIPUNCTURE: CPT

## 2021-08-08 ASSESSMENT — PAIN SCALES - GENERAL: PAINLEVEL_OUTOF10: 8

## 2021-08-09 ENCOUNTER — APPOINTMENT (OUTPATIENT)
Dept: ULTRASOUND IMAGING | Age: 31
End: 2021-08-09
Payer: COMMERCIAL

## 2021-08-09 ENCOUNTER — HOSPITAL ENCOUNTER (EMERGENCY)
Age: 31
Discharge: HOME | End: 2021-08-09
Attending: EMERGENCY MEDICINE
Payer: COMMERCIAL

## 2021-08-09 ENCOUNTER — APPOINTMENT (OUTPATIENT)
Dept: CT IMAGING | Age: 31
End: 2021-08-09
Payer: COMMERCIAL

## 2021-08-09 VITALS
HEIGHT: 65 IN | WEIGHT: 150 LBS | SYSTOLIC BLOOD PRESSURE: 116 MMHG | RESPIRATION RATE: 16 BRPM | OXYGEN SATURATION: 99 % | DIASTOLIC BLOOD PRESSURE: 66 MMHG | BODY MASS INDEX: 24.99 KG/M2 | TEMPERATURE: 98.1 F | HEART RATE: 90 BPM

## 2021-08-09 DIAGNOSIS — R10.32 ABDOMINAL PAIN, LEFT LOWER QUADRANT: Primary | ICD-10-CM

## 2021-08-09 DIAGNOSIS — E87.1 HYPONATREMIA: ICD-10-CM

## 2021-08-09 DIAGNOSIS — R11.2 NON-INTRACTABLE VOMITING WITH NAUSEA, UNSPECIFIED VOMITING TYPE: ICD-10-CM

## 2021-08-09 LAB
BACTERIA: ABNORMAL /HPF
BILIRUBIN URINE: ABNORMAL
BLOOD, URINE: NEGATIVE
CLARITY: CLEAR
COLOR: YELLOW
EPITHELIAL CELLS, UA: ABNORMAL /HPF (ref 0–5)
GLUCOSE URINE: NEGATIVE MG/DL
KETONES, URINE: ABNORMAL MG/DL
LEUKOCYTE ESTERASE, URINE: ABNORMAL
LIPASE: 17 U/L (ref 13–60)
MICROSCOPIC EXAMINATION: YES
MUCUS: ABNORMAL /LPF
NITRITE, URINE: NEGATIVE
PH UA: 5.5 (ref 5–8)
PROTEIN UA: NEGATIVE MG/DL
RBC UA: ABNORMAL /HPF (ref 0–4)
SPECIFIC GRAVITY UA: >=1.03 (ref 1–1.03)
URINE REFLEX TO CULTURE: YES
URINE TYPE: ABNORMAL
UROBILINOGEN, URINE: 0.2 E.U./DL
WBC UA: ABNORMAL /HPF (ref 0–5)

## 2021-08-09 PROCEDURE — 74177 CT ABD & PELVIS W/CONTRAST: CPT

## 2021-08-09 PROCEDURE — 93975 VASCULAR STUDY: CPT

## 2021-08-09 PROCEDURE — 6360000004 HC RX CONTRAST MEDICATION: Performed by: NURSE PRACTITIONER

## 2021-08-09 PROCEDURE — 76856 US EXAM PELVIC COMPLETE: CPT

## 2021-08-09 PROCEDURE — 87086 URINE CULTURE/COLONY COUNT: CPT

## 2021-08-09 PROCEDURE — 2580000003 HC RX 258: Performed by: NURSE PRACTITIONER

## 2021-08-09 PROCEDURE — 6360000002 HC RX W HCPCS: Performed by: NURSE PRACTITIONER

## 2021-08-09 PROCEDURE — 76830 TRANSVAGINAL US NON-OB: CPT

## 2021-08-09 PROCEDURE — 81001 URINALYSIS AUTO W/SCOPE: CPT

## 2021-08-09 RX ORDER — ONDANSETRON 2 MG/ML
4 INJECTION INTRAMUSCULAR; INTRAVENOUS ONCE
Status: COMPLETED | OUTPATIENT
Start: 2021-08-09 | End: 2021-08-09

## 2021-08-09 RX ORDER — 0.9 % SODIUM CHLORIDE 0.9 %
1000 INTRAVENOUS SOLUTION INTRAVENOUS ONCE
Status: COMPLETED | OUTPATIENT
Start: 2021-08-09 | End: 2021-08-09

## 2021-08-09 RX ADMIN — IOPAMIDOL 75 ML: 755 INJECTION, SOLUTION INTRAVENOUS at 02:02

## 2021-08-09 RX ADMIN — SODIUM CHLORIDE 1000 ML: 9 INJECTION, SOLUTION INTRAVENOUS at 01:44

## 2021-08-09 RX ADMIN — ONDANSETRON 4 MG: 2 INJECTION INTRAMUSCULAR; INTRAVENOUS at 01:45

## 2021-08-09 RX ADMIN — SODIUM CHLORIDE 1000 ML: 9 INJECTION, SOLUTION INTRAVENOUS at 02:49

## 2021-08-09 ASSESSMENT — PAIN SCALES - GENERAL: PAINLEVEL_OUTOF10: 5

## 2021-08-09 NOTE — ED PROVIDER NOTES
Emergency Department Provider Note     Location: [facility]  ED  8/8/2021    I independently performed a history and physical on [facility]. All diagnostic, treatment, and disposition decisions were made by myself in conjunction with the mid-level provider. Briefly, this is a 27 y.o. female here for abdominal pain and concern for constipation. ED Triage Vitals [08/08/21 2220]   BP Temp Temp Source Pulse Resp SpO2 Height Weight   115/81 98.1 °F (36.7 °C) Oral 89 18 98 % 5' 5\" (1.651 m) 150 lb (68 kg)        Patient resting comfortably in no acute distress. Heart is regular rate and rhythm. Lungs clear to auscultation bilaterally. Abdomen is soft, nondistended, and nontender. No peripheral edema noted. Patient seen and examined. CT ABDOMEN PELVIS W IV CONTRAST Additional Contrast? None    Result Date: 8/9/2021  EXAMINATION: CT OF THE ABDOMEN AND PELVIS WITH CONTRAST 8/9/2021 1:52 am TECHNIQUE: CT of the abdomen and pelvis was performed with the administration of intravenous contrast. Multiplanar reformatted images are provided for review. Dose modulation, iterative reconstruction, and/or weight based adjustment of the mA/kV was utilized to reduce the radiation dose to as low as reasonably achievable. COMPARISON: None. HISTORY: ORDERING SYSTEM PROVIDED HISTORY: LLQ abd pain with n/v/d, concern for poss SBO TECHNOLOGIST PROVIDED HISTORY: Additional Contrast?->None Reason for exam:->LLQ abd pain with n/v/d, concern for poss SBO Decision Support Exception - unselect if not a suspected or confirmed emergency medical condition->Emergency Medical Condition (MA) Reason for Exam: LLQ pain with N/V/D, poss SBO Acuity: Acute Type of Exam: Initial FINDINGS: Lower Chest:  3 mm nodule in the left lower lobe. No follow-up is necessary. Lungs are otherwise clear. Base of the heart normal. Organs:  The liver, gallbladder, biliary ducts, pancreas and spleen are normal. Kidneys and adrenal glands are normal. GI/Bowel: The stomach, duodenum and small bowel are normal. Nonvisualized appendix. The colon is normal. Pelvis: The bladder is unremarkable. The uterus is normal.  The right ovary is not identified. No inflammation in the adnexa. Peripherally enhancing left adnexal cyst that is suspected to be hemorrhagic. Peritoneum/Retroperitoneum: The aorta tapers normally. No lymph node enlargement. Bones/Soft Tissues: No significant skeletal abnormalities. 1. The GI tract is normal.  There is no pattern of obstruction. 2. Hemorrhagic left ovarian cyst with recommendations provided below. 3. No other significant finding in the abdomen or pelvis. RECOMMENDATIONS: Small corpus luteum adnexal cyst.  No follow-up imaging is recommended.  Reference: Ashely Perez Radiol 2013;10:675-681         Results for orders placed or performed during the hospital encounter of 08/09/21   CBC auto differential   Result Value Ref Range    WBC 8.0 4.0 - 11.0 K/uL    RBC 4.95 4.00 - 5.20 M/uL    Hemoglobin 15.2 12.0 - 16.0 g/dL    Hematocrit 43.3 36.0 - 48.0 %    MCV 87.6 80.0 - 100.0 fL    MCH 30.8 26.0 - 34.0 pg    MCHC 35.2 31.0 - 36.0 g/dL    RDW 13.0 12.4 - 15.4 %    Platelets 615 996 - 594 K/uL    MPV 8.8 5.0 - 10.5 fL    Neutrophils % 73.5 %    Lymphocytes % 16.8 %    Monocytes % 8.8 %    Eosinophils % 0.7 %    Basophils % 0.2 %    Neutrophils Absolute 5.9 1.7 - 7.7 K/uL    Lymphocytes Absolute 1.4 1.0 - 5.1 K/uL    Monocytes Absolute 0.7 0.0 - 1.3 K/uL    Eosinophils Absolute 0.1 0.0 - 0.6 K/uL    Basophils Absolute 0.0 0.0 - 0.2 K/uL   Comprehensive Metabolic Panel w/ Reflex to MG   Result Value Ref Range    Sodium 129 (L) 136 - 145 mmol/L    Potassium reflex Magnesium 4.1 3.5 - 5.1 mmol/L    Chloride 101 99 - 110 mmol/L    CO2 17 (L) 21 - 32 mmol/L    Anion Gap 11 3 - 16    Glucose 114 (H) 70 - 99 mg/dL    BUN 12 7 - 20 mg/dL    CREATININE 0.7 0.6 - 1.1 mg/dL    GFR Non-African American >60 >60    GFR  >60 >60    Calcium 9.4 8.3 - 10.6 mg/dL    Total Protein 7.5 6.4 - 8.2 g/dL    Albumin 4.6 3.4 - 5.0 g/dL    Albumin/Globulin Ratio 1.6 1.1 - 2.2    Total Bilirubin 0.8 0.0 - 1.0 mg/dL    Alkaline Phosphatase 44 40 - 129 U/L    ALT 7 (L) 10 - 40 U/L    AST 15 15 - 37 U/L    Globulin 2.9 g/dL   HCG Qualitative, Serum   Result Value Ref Range    hCG Qual Negative Detects HCG level >10 MIU/mL   Urinalysis Reflex to Culture    Specimen: Urine, clean catch   Result Value Ref Range    Color, UA Yellow Straw/Yellow    Clarity, UA Clear Clear    Glucose, Ur Negative Negative mg/dL    Bilirubin Urine SMALL (A) Negative    Ketones, Urine TRACE (A) Negative mg/dL    Specific Gravity, UA >=1.030 1.005 - 1.030    Blood, Urine Negative Negative    pH, UA 5.5 5.0 - 8.0    Protein, UA Negative Negative mg/dL    Urobilinogen, Urine 0.2 <2.0 E.U./dL    Nitrite, Urine Negative Negative    Leukocyte Esterase, Urine TRACE (A) Negative    Microscopic Examination YES     Urine Type NotGiven     Urine Reflex to Culture Yes    Lipase   Result Value Ref Range    Lipase 17.0 13.0 - 60.0 U/L   Microscopic Urinalysis   Result Value Ref Range    Mucus, UA 1+ (A) None Seen /LPF    WBC, UA 21-50 (A) 0 - 5 /HPF    RBC, UA 3-4 0 - 4 /HPF    Epithelial Cells, UA 11-20 (A) 0 - 5 /HPF    Bacteria, UA 3+ (A) None Seen /HPF       For further details of [facility] emergency department encounter, please see Nata's documentation. Patient was offered pelvic US in the ED after the results of the CT were reviewed with her and that study is pending when the patient was endorsed to Dr. Darleen Meigs to follow up with that study; concern for torsion is low as she has no sudden, severe pain , toxicity. 1. Abdominal pain, left lower quadrant    2. Hyponatremia    3.  Non-intractable vomiting with nausea, unspecified vomiting type      This chart was generated in part by using Dragon Dictation system and may contain errors related to that system including errors in grammar, punctuation, and spelling, as well as words and phrases that may be inappropriate. If there are any questions or concerns please feel free to contact the dictating provider for clarification.          Amaury Livingston MD  08/11/21 1273

## 2021-08-09 NOTE — ED PROVIDER NOTES
[facility]  ED  EMERGENCY DEPARTMENT ENCOUNTER        Pt Name: Janay Leblanc  MRN: [id number]  [place] 1990  Date of evaluation: 8/8/2021  Provider: MAURILIO Carlos CNP  PCP: Mary Guerrero DO  Note Started: 12:58 AM EDT        I have seen and evaluated this patient with my supervising physician Dr. Kathryn Burton       Chief Complaint   Patient presents with    Abdominal Pain     LLQ abd pain and cramping with watery stool today + nausea    Fecal Impaction     for a few weeks, seen on xray about a month ago       HISTORY OF PRESENT ILLNESS   (Location, Timing/Onset, Context/Setting, Quality, Duration, Modifying Factors, Severity, Associated Signs and Symptoms)  Note limiting factors. Chief Complaint: Nausea, vomiting, diarrhea, constipation    Janay Leblanc is a 27 y.o. female with medical history of GERD, endometriosis, HTN, POTS, small fiber neuropathy, GERD who presents to the emergency department with concern for possible bowel obstruction. Patient notes for the past month she has had left lower quadrant abdominal pain with intermittent episodes of diarrhea and constipation. Initially she thought this was related to her IBS. She was instructed to take magnesium citrate and MiraLAX to have normal bowel movements. She has been taking these medications and continues to have diarrhea. She feels like there is a bowel obstruction in her left lower quadrant as she feels a firmness and believes that is a stool burden. She says she was seen by GI and they told her it was \"too far up to get with a enema. \"She does have an appointment with GI this week. She also has associated nausea and vomiting and states she last had emesis while in the emergency department waiting room and has not been able to tolerate much foods and fluids and believes she is possibly dehydrated.   She does have urinary frequency, although she relates this to her autonomic nerve malfunction. She denies any associated chest pain, cough, wheezing, rashes, fevers, light, dizzy, syncope, headache, neck pain, or back pain. She has received the COVID-19 vaccine. LMP 2021. Nursing Notes were all reviewed and agreed with or any disagreements were addressed in the HPI. REVIEW OF SYSTEMS    (2-9 systems for level 4, 10 or more for level 5)     Review of Systems    Positives and Pertinent negatives as per HPI. Except as noted above in the ROS, all other systems were reviewed and negative. PAST MEDICAL HISTORY     Past Medical History:   Diagnosis Date    Acid reflux     Endometriosis 2014    Hypertension     POTS (postural orthostatic tachycardia syndrome)     Small fiber neuropathy          SURGICAL HISTORY     Past Surgical History:   Procedure Laterality Date    BREAST CYST EXCISION Right 2017     SECTION  2019    Uterine extension    NASAL SEPTUM SURGERY  2018    OVARIAN CYST REMOVAL Right 2014    SINUS SURGERY      UPPER GASTROINTESTINAL ENDOSCOPY N/A 2020    EGD BIOPSY performed by Jaya Ho MD at [address] [place] [address]           CURRENTMEDICATIONS       Previous Medications    ALBUTEROL SULFATE  (90 BASE) MCG/ACT INHALER    Inhale 2 puffs into the lungs every 6 hours as needed    CRANBERRY-VITAMIN C (AZO CRANBERRY URINARY TRACT) 250-60 MG CAPS    Take by mouth    CYANOCOBALAMIN 1000 MCG/ML INJECTION    INJECT 1 ML INTO THE MUSCLE EVERY 30 DAYS    IPRATROPIUM (ATROVENT) 0.03 % NASAL SPRAY    2 sprays by Each Nostril route 4 times daily    MAGNESIUM 80 MG TABS    Take by mouth    PROPRANOLOL (INDERAL LA) 120 MG EXTENDED RELEASE CAPSULE    Take 1 capsule by mouth daily    SYRINGE/NEEDLE, DISP, (SYRINGE 3CC/25GX5/8\") 25G X 5/8\" 3 ML MISC    Use as directed to administer B12 once a month         ALLERGIES     Patient has no known allergies.     FAMILYHISTORY       Family History   Problem cooperative. DIAGNOSTIC RESULTS   LABS:    Labs Reviewed   COMPREHENSIVE METABOLIC PANEL W/ REFLEX TO MG FOR LOW K - Abnormal; Notable for the following components:       Result Value    Sodium 129 (*)     CO2 17 (*)     Glucose 114 (*)     ALT 7 (*)     All other components within normal limits    Narrative:     Performed at:  [address], [facility]   Phone [phone]   CBC WITH AUTO DIFFERENTIAL    Narrative:     Performed at:  [address], [facility]   Phone [phone]   HCG, SERUM, QUALITATIVE    Narrative:     Performed at:  [address], [facility]   Phone [phone]   URINE RT REFLEX TO CULTURE   LIPASE       When ordered only abnormal lab results are displayed. All other labs were within normal range or not returned as of this dictation. EKG: When ordered, EKG's are interpreted by the Emergency Department Physician in the absence of a cardiologist.  Please see their note for interpretation of EKG. RADIOLOGY:   Non-plain film images such as CT, Ultrasound and MRI are read by the radiologist. Plain radiographic images are visualized and preliminarily interpreted by the ED Provider with the below findings:        Interpretation per the Radiologist below, if available at the time of this note:    CT ABDOMEN PELVIS W IV CONTRAST Additional Contrast? None    (Results Pending)     No results found.         PROCEDURES   Unless otherwise noted below, none     Procedures    CRITICAL CARE TIME   N/A    CONSULTS:  None      EMERGENCY DEPARTMENT COURSE and DIFFERENTIAL DIAGNOSIS/MDM:   Vitals:    Vitals:    08/08/21 2220 08/09/21 0122   BP: 115/81 107/88   Pulse: 89 75   Resp: 18 18   Temp: 98.1 °F (36.7 °C)    TempSrc: Oral    SpO2: 98% 99%   Weight: 150 lb (68 kg)    Height: 5' 5\" (1.651 m)        Patient was given Patient's fluids are almost completed. States she feels very dehydrated and asking for additional fluids. She ambulated to the restroom without difficulty. Informed on additional results are pending. At this point she will be given a p.o. challenge and patient care is turned over to Dr. Joey Sagastume pending final results and disposition. FINAL IMPRESSION      1. Abdominal pain, left lower quadrant    2. Hyponatremia    3.  Non-intractable vomiting with nausea, unspecified vomiting type          DISPOSITION/PLAN   DISPOSITION             (Please note that portions of this note were completed with a voice recognition program.  Efforts were made to edit the dictations but occasionally words are mis-transcribed.)    MAURILIO Baker CNP (electronically signed)            MAURILIO Baker CNP  08/10/21 6401

## 2021-08-09 NOTE — ED NOTES
Patient discharged with all belongings and discharge paperwork. Patient verbalized understanding of discharge instructions and follow up with PCP/OBGYN. Patient ambulatory with steady gait out of ED.         Stefan Solorio RN  08/09/21 6220

## 2021-08-09 NOTE — ED NOTES
RN to bedside, patient states she would like to leave at this time and not wait for US results. Patient states the person currently watching her son has to be at the airport in a couple hours and she needs to get home to her child. Dr. Stephanie Jeffers aware.       Russell Finney RN  08/09/21 0384

## 2021-08-10 LAB — URINE CULTURE, ROUTINE: NORMAL

## 2021-08-12 ENCOUNTER — TELEPHONE (OUTPATIENT)
Dept: FAMILY MEDICINE CLINIC | Age: 31
End: 2021-08-12

## 2021-08-12 ENCOUNTER — OFFICE VISIT (OUTPATIENT)
Dept: GASTROENTEROLOGY | Age: 31
End: 2021-08-12
Payer: COMMERCIAL

## 2021-08-12 ENCOUNTER — TELEPHONE (OUTPATIENT)
Dept: GASTROENTEROLOGY | Age: 31
End: 2021-08-12

## 2021-08-12 VITALS
SYSTOLIC BLOOD PRESSURE: 108 MMHG | BODY MASS INDEX: 25.56 KG/M2 | HEIGHT: 65 IN | WEIGHT: 153.4 LBS | HEART RATE: 103 BPM | DIASTOLIC BLOOD PRESSURE: 70 MMHG

## 2021-08-12 DIAGNOSIS — R19.7 DIARRHEA, UNSPECIFIED TYPE: Primary | ICD-10-CM

## 2021-08-12 PROCEDURE — 1036F TOBACCO NON-USER: CPT | Performed by: INTERNAL MEDICINE

## 2021-08-12 PROCEDURE — G8419 CALC BMI OUT NRM PARAM NOF/U: HCPCS | Performed by: INTERNAL MEDICINE

## 2021-08-12 PROCEDURE — G8427 DOCREV CUR MEDS BY ELIG CLIN: HCPCS | Performed by: INTERNAL MEDICINE

## 2021-08-12 PROCEDURE — 99213 OFFICE O/P EST LOW 20 MIN: CPT | Performed by: INTERNAL MEDICINE

## 2021-08-12 NOTE — TELEPHONE ENCOUNTER
----- Message from Alfreda Hilario sent at 8/12/2021 10:42 AM EDT -----  Subject: Message to Provider    QUESTIONS  Information for Provider? Patient wants to discuss her results from her   lab work. Please call.  ---------------------------------------------------------------------------  --------------  CALL BACK INFO  What is the best way for the office to contact you? OK to leave message on   voicemail  Preferred Call Back Phone Number? [phone]  ---------------------------------------------------------------------------  --------------  SCRIPT ANSWERS  Relationship to Patient? Self  Specialty Confirmation? Primary Care  (Is the patient requesting to be seen urgently for their symptoms?)? No  Are you having any new concerns about your existing condition?  Yes

## 2021-08-12 NOTE — TELEPHONE ENCOUNTER
She no showed for her appt this week for follow up ER which I assume are the labs she has questions about. OK to reschedule the appt to discuss the results and follow up on how she is doing. If she has a specific question, let me know.

## 2021-08-12 NOTE — TELEPHONE ENCOUNTER
Provider:Gisselle  Has the patient been vaccinated against COVID? Yes  Procedure:Colon  Sedation:MAC  Type of prep:2 day  Location:[place]  Prep instructions provided to patient during office visit

## 2021-08-12 NOTE — PROGRESS NOTES
Patient  evaluated in the ED on 8/9/21 for abdominal pain and diarrhea. Patient states she had a big bowel movement that seems to have elevated some of her symptoms. She still feels as though  she is constipated.

## 2021-08-12 NOTE — PROGRESS NOTES
[address] [facility],  [address] [place] [address] Ave  [place], [address] [place] [address]  Phone: [phone]   [address] [place] [address] She is a Single [1] White (non-) [1] 27 y.o. [facility] female        Main Problems/Chief Complaint   Diarrhea    HPI      The patient continues to have diarrhea. No blood in the stool. She took med for [employer] and thinks that she has passed the worms.     PAST MEDICAL HISTORY     Past Medical History:   Diagnosis Date    Acid reflux     Endometriosis 2014    Hypertension     POTS (postural orthostatic tachycardia syndrome)     Small fiber neuropathy      FAMILY HISTORY     Family History   Problem Relation Age of Onset    Breast Cancer Maternal Grandmother     Heart Attack Paternal Grandfather     Hypertension Paternal Grandfather     Diabetes Paternal Grandfather     No Known Problems Mother     No Known Problems Father      SOCIAL HISTORY     Social History     Socioeconomic History    Marital status: Single     Spouse name: Not on file    Number of children: Not on file    Years of education: Not on file    Highest education level: Not on file   Occupational History    Not on file   Tobacco Use    Smoking status: Former Smoker     Quit date: 2014     Years since quittin.2    Smokeless tobacco: Never Used   Vaping Use    Vaping Use: Former   Substance and Sexual Activity    Alcohol use: Yes     Comment: occas    Drug use: Yes     Types: Marijuana    Sexual activity: Not Currently     Birth control/protection: Other-see comments   Other Topics Concern    Not on file   Social History Narrative    Not on file     Social Determinants of Health     Financial Resource Strain: Low Risk     Difficulty of Paying Living Expenses: Not hard at all   Food Insecurity: No Food Insecurity    Worried About Running Out of Food in the Last Year: Never true    Etienne of Food in the Last Year: Never true   Transportation Needs:     Lack of Transportation (Medical):      Lack of Transportation (Non-Medical):    Physical Activity:     Days of Exercise per Week:     Minutes of Exercise per Session:    Stress:     Feeling of Stress :    Social Connections:     Frequency of Communication with Friends and Family:     Frequency of Social Gatherings with Friends and Family:     Attends Zoroastrianism Services:     Active Member of Clubs or Organizations:     Attends Club or Organization Meetings:     Marital Status:    Intimate Partner Violence:     Fear of Current or Ex-Partner:     Emotionally Abused:     Physically Abused:     Sexually Abused:      SURGICAL HISTORY     Past Surgical History:   Procedure Laterality Date    BREAST CYST EXCISION Right 2017     SECTION  2019    Uterine extension    NASAL SEPTUM SURGERY  2018    OVARIAN CYST REMOVAL Right 2014    SINUS SURGERY      UPPER GASTROINTESTINAL ENDOSCOPY N/A 2020    EGD BIOPSY performed by Ramona Redman MD at [address]       CURRENT MEDICATIONS   (This list may include medications prescribed during this encounter as epic can not insert only the list prior to this encounter.)  Current Outpatient Rx   Medication Sig Dispense Refill    cyanocobalamin 1000 MCG/ML injection INJECT 1 ML INTO THE MUSCLE EVERY 30 DAYS 3 mL 1    propranolol (INDERAL LA) 120 MG extended release capsule Take 1 capsule by mouth daily (Patient taking differently: Take 80 mg by mouth daily 80 mg daily) 90 capsule 3    Cranberry-Vitamin C (AZO CRANBERRY URINARY TRACT) 250-60 MG CAPS Take by mouth      Magnesium 80 MG TABS Take by mouth      albuterol sulfate  (90 Base) MCG/ACT inhaler Inhale 2 puffs into the lungs every 6 hours as needed      Syringe/Needle, Disp, (SYRINGE 3CC/25GX5/8\") 25G X 5/8\" 3 ML MISC Use as directed to administer B12 once a month 1 each 11       ALLERGIES   No Known Allergies    REVIEW OF SYSTEMS   No chest pain suspicious for cardiac origin  No SOB    PHYSICAL EXAM   VITAL SIGNS: /70 (Site: Right Upper Arm)   Pulse 103   Ht 5' 5\" (1.651 m)   Wt 153 lb 6.4 oz (69.6 kg)   LMP 07/21/2021   BMI 25.53 kg/m²   Wt Readings from Last 1 Encounters:   08/12/21 153 lb 6.4 oz (69.6 kg)     Constitutional: Well developed, Well nourished, No acute distress, Non-toxic appearance. Heart: WNL  Lungs: Clear to A&P  Abd: soft, non-tender, no masses are felt. Neurologic: Alert & oriented x 3. Psych: Good mood and memory. Pt is able to make appropriate decisions. FINAL IMPRESSION AND RECOMMENDATIONS     Persistent diarrhea with -ve w/u. The patient has been explained the risks and benefits of colonoscopy with biopsy, polypectomy and other interventions as needed. The risks explained to the patient included, but were not limited to, bleeding, perforation, infection, suppression of breathing, cardiac arrhythmias, heart attack, stroke, need for surgery or hospitalization and remotely even death. The alternatives to colonoscopy including CT colonography were discussed with pros and cons. The patient is explained that compared to other alternatives to colonoscopy for evaluation of the colon, colonoscopy was the most accurate test as long as the preparation is adequate. The patient is also explained that in some cases colonoscopy cannot be completed because of a variety of reasons including poor prep. The prognosis in case colonoscopy is not done was also explained. The complication rate is 0.3 to 1.5% in general when minor and major complications are put together. The prognosis is explained if the procedure is not done. The overall complication rate with this procedure is still low enough in this case, in my opinion, that risk to benefit ratio is acceptable, but the final decision is that of the patient. This was explained to the paitent. The patient voiced understanding of what was discussed.  The patient was given opportunity to ask questions. No question was left unanswered. The patient expressed the desire to go ahead with the colonoscopy and gave informed consent saying. The total time spent face-to-face during this visit and before and after the visit was 20 minutes with more than 50% of the time spent in reviewing the electronic chart which showed some fecal loading, coordination of care for setting up colonoscopy and counseling the patient about the problems with constipation and its management and about risk benefits, prognosis and alternatives to colonoscopy . I have seen the above patient in the capacity of a GI consultant. This consultation does not establish patient-physician relationship between a patient and a primary care provider. rPanav Scott MD 8/12/21 4:10 PM EDT    CC:  Alberto Baca, DO      IMPORTANT: Please note that some portions of this note may have been created using Dragon voice recognition software. Some \"sound-alike\" and totally wrong word substitutions may have taken place due to known inherent limitations of any such software, including this voice recognition software. In spite of efforts to eliminate such errors, some may not have been corrected. So please read the note with this in mind and recognize such mistakes and understand the correct version using the  context. Thanks.

## 2021-08-13 ENCOUNTER — TELEPHONE (OUTPATIENT)
Dept: FAMILY MEDICINE CLINIC | Age: 31
End: 2021-08-13

## 2021-08-13 DIAGNOSIS — R19.7 DIARRHEA, UNSPECIFIED TYPE: Primary | ICD-10-CM

## 2021-08-13 RX ORDER — POLYETHYLENE GLYCOL 3350 17 G/17G
POWDER, FOR SOLUTION ORAL
Qty: 238 G | Refills: 1 | Status: SHIPPED | OUTPATIENT
Start: 2021-08-13

## 2021-08-13 RX ORDER — BISACODYL 5 MG
TABLET, DELAYED RELEASE (ENTERIC COATED) ORAL
Qty: 4 TABLET | Refills: 0 | Status: SHIPPED | OUTPATIENT
Start: 2021-08-13 | End: 2022-05-10

## 2021-08-13 NOTE — TELEPHONE ENCOUNTER
Spoke to Theharmony Hernandez,   Discussed ER labs, urine culture. answered questions. She will follow up next week. Allayed her fears and let her know we are here to help her.

## 2021-08-16 ENCOUNTER — VIRTUAL VISIT (OUTPATIENT)
Dept: FAMILY MEDICINE CLINIC | Age: 31
End: 2021-08-16
Payer: COMMERCIAL

## 2021-08-16 DIAGNOSIS — G90.A POTS (POSTURAL ORTHOSTATIC TACHYCARDIA SYNDROME): Primary | ICD-10-CM

## 2021-08-16 PROCEDURE — 99213 OFFICE O/P EST LOW 20 MIN: CPT | Performed by: NURSE PRACTITIONER

## 2021-08-16 PROCEDURE — 1111F DSCHRG MED/CURRENT MED MERGE: CPT | Performed by: NURSE PRACTITIONER

## 2021-08-16 PROCEDURE — G8427 DOCREV CUR MEDS BY ELIG CLIN: HCPCS | Performed by: NURSE PRACTITIONER

## 2021-08-17 ENCOUNTER — PATIENT MESSAGE (OUTPATIENT)
Dept: FAMILY MEDICINE CLINIC | Age: 31
End: 2021-08-17

## 2021-08-17 NOTE — TELEPHONE ENCOUNTER
From: Donna Roche  To: Yoanaariel JollyPalakJAMIE felizN - CNP  Sent: 8/17/2021 5:58 AM EDT  Subject: Visit Follow-Up Question    Can you order a diabetes/diabetes mellitus test. My blood sugar levels were on the high side at the hospital and it runs in my family on both sides/I have had eye/skin and vein issues for a few months now so I just want to make sure that's not what is going on. My great aunt had to have her legs amputated because it wasn't caught in time.

## 2021-08-24 DIAGNOSIS — G90.A POTS (POSTURAL ORTHOSTATIC TACHYCARDIA SYNDROME): ICD-10-CM

## 2021-08-26 RX ORDER — PROPRANOLOL HYDROCHLORIDE 80 MG/1
CAPSULE, EXTENDED RELEASE ORAL
Qty: 90 CAPSULE | Refills: 1 | OUTPATIENT
Start: 2021-08-26

## 2021-08-31 ENCOUNTER — TELEPHONE (OUTPATIENT)
Dept: CARDIOLOGY CLINIC | Age: 31
End: 2021-08-31

## 2021-08-31 RX ORDER — PROPRANOLOL HYDROCHLORIDE 40 MG/1
40 TABLET ORAL 2 TIMES DAILY PRN
Qty: 90 TABLET | Refills: 1 | Status: SHIPPED | OUTPATIENT
Start: 2021-08-31 | End: 2021-10-15

## 2021-08-31 RX ORDER — PROPRANOLOL HYDROCHLORIDE 80 MG/1
80 CAPSULE, EXTENDED RELEASE ORAL DAILY
Qty: 90 CAPSULE | Refills: 3 | Status: SHIPPED | OUTPATIENT
Start: 2021-08-31 | End: 2021-10-15

## 2021-08-31 NOTE — PROGRESS NOTES
Post-Discharge Transitional Care Management Services or Hospital Follow Up      Janay Leblanc   YOB: 1990    Date of Office Visit:  8/16/2021  Date of Hospital Admission: 8/9/21  Date of Hospital Discharge: 8/9/21  Risk of hospital readmission (high >=14%.  Medium >=10%) :No data recorded    Care management risk score Rising risk (score 2-5) and Complex Care (Scores >=6): 0     Non face to face  following discharge, date last encounter closed (first attempt may have been earlier): *No documented post hospital discharge outreach found in the last 14 days    Call initiated 2 business days of discharge: *No response recorded in the last 14 days    Patient Active Problem List   Diagnosis    Diarrhea    Low vitamin B12 level    Lower abdominal pain    Gastroesophageal reflux disease    POTS (postural orthostatic tachycardia syndrome)    SOB (shortness of breath)    Swelling    Atherosclerosis of other arteries, CT head 2020       No Known Allergies    Medications listed as ordered at the time of discharge from [address] Medication Instructions KATIE:    Printed on:08/31/21 1300   Medication Information                      albuterol sulfate  (90 Base) MCG/ACT inhaler  Inhale 2 puffs into the lungs every 6 hours as needed             bisacodyl (BISACODYL) 5 MG EC tablet  Use as directed for colonoscopy prep             Cranberry-Vitamin C (AZO CRANBERRY URINARY TRACT) 250-60 MG CAPS  Take by mouth             cyanocobalamin 1000 MCG/ML injection  INJECT 1 ML INTO THE MUSCLE EVERY 30 DAYS             Magnesium 80 MG TABS  Take by mouth             magnesium citrate solution  Take 296 mLs by mouth once for 1 dose For colonoscopy prep             polyethylene glycol (GLYCOLAX) 17 GM/SCOOP powder  Use as directed for colonoscopy prep             propranolol (INDERAL LA) 80 MG extended release capsule  Take 1 capsule by mouth daily             propranolol (INDERAL) 40 MG tablet  Take 1 tablet by mouth 2 times daily as needed (Palpitations and tachycardia)             Syringe/Needle, Disp, (SYRINGE 3CC/25GX5/8\") 25G X 5/8\" 3 ML MISC  Use as directed to administer B12 once a month                   Medications marked \"taking\" at this time  Outpatient Medications Marked as Taking for the 8/16/21 encounter (Virtual Visit) with MAURILIO Downing CNP   Medication Sig Dispense Refill    magnesium citrate solution Take 296 mLs by mouth once for 1 dose For colonoscopy prep 296 mL 0    cyanocobalamin 1000 MCG/ML injection INJECT 1 ML INTO THE MUSCLE EVERY 30 DAYS 3 mL 1    [DISCONTINUED] propranolol (INDERAL LA) 120 MG extended release capsule Take 1 capsule by mouth daily (Patient taking differently: Take 80 mg by mouth daily 80 mg daily) 90 capsule 3    Cranberry-Vitamin C (AZO CRANBERRY URINARY TRACT) 250-60 MG CAPS Take by mouth      Magnesium 80 MG TABS Take by mouth      albuterol sulfate  (90 Base) MCG/ACT inhaler Inhale 2 puffs into the lungs every 6 hours as needed          Medications patient taking as of now reconciled against medications ordered at time of hospital discharge: Yes    Chief Complaint   Patient presents with    Follow-Up from CHAGO THOMAS     seen at [facility] ER on 8/9 abdominal pain. states she has a Colonoscopy scheduled for 8/30. is following up with GI on this. is starting to feel better       History of Present illness - Follow up of Hospital diagnosis(es): 3 weeks    Inpatient course: Discharge summary reviewed- see chart. Interval history/Current status: stable    A comprehensive review of systems was negative except for what was noted in the HPI. There were no vitals filed for this visit. There is no height or weight on file to calculate BMI.    Wt Readings from Last 3 Encounters:   08/12/21 153 lb 6.4 oz (69.6 kg)   08/08/21 150 lb (68 kg)   06/25/21 165 lb (74.8 kg)     BP Readings from Last 3 Encounters:   08/12/21 108/70 08/09/21 116/66   06/25/21 116/70        Physical Exam:  none    Assessment/Plan:  Anxiety and migraines refer to neurology      Medical Decision Making: moderate complexity

## 2021-09-01 ENCOUNTER — TELEPHONE (OUTPATIENT)
Dept: CARDIOLOGY CLINIC | Age: 31
End: 2021-09-01

## 2021-09-01 ENCOUNTER — OFFICE VISIT (OUTPATIENT)
Dept: ENT CLINIC | Age: 31
End: 2021-09-01
Payer: COMMERCIAL

## 2021-09-01 VITALS
BODY MASS INDEX: 25.83 KG/M2 | DIASTOLIC BLOOD PRESSURE: 74 MMHG | SYSTOLIC BLOOD PRESSURE: 155 MMHG | HEIGHT: 65 IN | HEART RATE: 72 BPM | WEIGHT: 155 LBS | TEMPERATURE: 98.1 F

## 2021-09-01 DIAGNOSIS — J30.9 ALLERGIC RHINITIS, UNSPECIFIED SEASONALITY, UNSPECIFIED TRIGGER: ICD-10-CM

## 2021-09-01 DIAGNOSIS — G43.909 MIGRAINE WITHOUT STATUS MIGRAINOSUS, NOT INTRACTABLE, UNSPECIFIED MIGRAINE TYPE: Primary | ICD-10-CM

## 2021-09-01 DIAGNOSIS — G50.1 ATYPICAL FACIAL PAIN: ICD-10-CM

## 2021-09-01 PROCEDURE — G8427 DOCREV CUR MEDS BY ELIG CLIN: HCPCS | Performed by: OTOLARYNGOLOGY

## 2021-09-01 PROCEDURE — 1036F TOBACCO NON-USER: CPT | Performed by: OTOLARYNGOLOGY

## 2021-09-01 PROCEDURE — G8419 CALC BMI OUT NRM PARAM NOF/U: HCPCS | Performed by: OTOLARYNGOLOGY

## 2021-09-01 PROCEDURE — 31231 NASAL ENDOSCOPY DX: CPT | Performed by: OTOLARYNGOLOGY

## 2021-09-01 PROCEDURE — 99213 OFFICE O/P EST LOW 20 MIN: CPT | Performed by: OTOLARYNGOLOGY

## 2021-09-01 RX ORDER — VALACYCLOVIR HYDROCHLORIDE 500 MG/1
500 TABLET, FILM COATED ORAL 2 TIMES DAILY
COMMUNITY

## 2021-09-01 ASSESSMENT — ENCOUNTER SYMPTOMS
SORE THROAT: 0
COUGH: 0
VOICE CHANGE: 0
TROUBLE SWALLOWING: 0
FACIAL SWELLING: 0
SINUS PRESSURE: 0
EYE ITCHING: 0
SHORTNESS OF BREATH: 0
APNEA: 0

## 2021-09-01 NOTE — PROGRESS NOTES
[address], [address], [address] [place] [address]  P: [phone]       Patient     Paresh Hussein  1990    ChiefComplaint     Chief Complaint   Patient presents with    Sinus Problem     Had SX done on her sinuses - Left superior oblique. When she had the surgery done she heard a loud pop and has some concerns that. She does have sinus pressure.  Ear Problem     Has pressure in ears and some discharge. States that the pressure is so bad that she feels it in her teeth. This has been going on for a few months off and on. History of Present Illness     Jose Krabbe is 34-year female here today for follow-up regarding nasal congestion, clear rhinorrhea and ear pressure. Since last seen by me she trialed Atrovent but was unable to tolerate it due to dizziness as soon as she sprayed the medication. Reports worsening facial pressure and ear pressure. States symptoms are similar to when she had previous sphenoid infection. has been taking her oral antihistamine. Has tried Flonase and Astelin in the past but reports this was without benefit. Allergy tested in [place] and reportedly negative.   We will be is currently looking for a new neurologist.    Past Medical History     Past Medical History:   Diagnosis Date    Acid reflux     Endometriosis 2014    Hypertension     POTS (postural orthostatic tachycardia syndrome)     Small fiber neuropathy        Past Surgical History     Past Surgical History:   Procedure Laterality Date    BREAST CYST EXCISION Right 2017     SECTION  2019    Uterine extension    NASAL SEPTUM SURGERY  2018    OVARIAN CYST REMOVAL Right 2014    SINUS SURGERY      UPPER GASTROINTESTINAL ENDOSCOPY N/A 2020    EGD BIOPSY performed by Js Clemens MD at [address] [place] [address]         Family History     Family History   Problem Relation Age of Onset    Breast Cancer Maternal Grandmother  Heart Attack Paternal Grandfather     Hypertension Paternal Grandfather     Diabetes Paternal Grandfather     No Known Problems Mother     No Known Problems Father        Social History     Social History     Tobacco Use    Smoking status: Former Smoker     Quit date: 2014     Years since quittin.2    Smokeless tobacco: Never Used   Vaping Use    Vaping Use: Former   Substance Use Topics    Alcohol use: Yes     Comment: occas    Drug use: Yes     Types: Marijuana        Allergies     No Known Allergies    Medications     Current Outpatient Medications   Medication Sig Dispense Refill    FLUCONAZOLE PO Take by mouth      valACYclovir (VALTREX) 500 MG tablet Take 500 mg by mouth 2 times daily      propranolol (INDERAL LA) 80 MG extended release capsule Take 1 capsule by mouth daily 90 capsule 3    propranolol (INDERAL) 40 MG tablet Take 1 tablet by mouth 2 times daily as needed (Palpitations and tachycardia) 90 tablet 1    bisacodyl (BISACODYL) 5 MG EC tablet Use as directed for colonoscopy prep 4 tablet 0    polyethylene glycol (GLYCOLAX) 17 GM/SCOOP powder Use as directed for colonoscopy prep 238 g 1    cyanocobalamin 1000 MCG/ML injection INJECT 1 ML INTO THE MUSCLE EVERY 30 DAYS 3 mL 1    Cranberry-Vitamin C (AZO CRANBERRY URINARY TRACT) 250-60 MG CAPS Take by mouth      Magnesium 80 MG TABS Take by mouth      albuterol sulfate  (90 Base) MCG/ACT inhaler Inhale 2 puffs into the lungs every 6 hours as needed      Syringe/Needle, Disp, (SYRINGE 3CC/25GX5/8\") 25G X 5/8\" 3 ML MISC Use as directed to administer B12 once a month 1 each 11    magnesium citrate solution Take 296 mLs by mouth once for 1 dose For colonoscopy prep 296 mL 0     No current facility-administered medications for this visit. Review of Systems     Review of Systems   Constitutional: Negative for appetite change, chills, fatigue, fever and unexpected weight change. HENT: Positive for congestion. Negative for ear discharge, ear pain, facial swelling, hearing loss, nosebleeds, postnasal drip, sinus pressure, sneezing, sore throat, tinnitus, trouble swallowing and voice change. +ear pressure   Eyes: Negative for itching. Respiratory: Negative for apnea, cough and shortness of breath. Gastrointestinal:        Negative for dysphasia   Endocrine: Negative for cold intolerance and heat intolerance. Musculoskeletal: Negative for myalgias and neck pain. Skin: Negative for rash. Allergic/Immunologic: Negative for environmental allergies. Neurological: Positive for headaches. Negative for dizziness. Psychiatric/Behavioral: Negative for confusion, decreased concentration and sleep disturbance. PhysicalExam     Vitals:    09/01/21 0943   BP: (!) 155/74   Site: Right Upper Arm   Position: Sitting   Cuff Size: Medium Adult   Pulse: 72   Temp: 98.1 °F (36.7 °C)   Weight: 155 lb (70.3 kg)   Height: 5' 5\" (1.651 m)       Physical Exam  Constitutional:       General: She is not in acute distress. Appearance: She is well-developed. HENT:      Head: Normocephalic and atraumatic. Right Ear: Tympanic membrane, ear canal and external ear normal. No drainage. No middle ear effusion. Tympanic membrane is not bulging. Tympanic membrane has normal mobility. Left Ear: Tympanic membrane, ear canal and external ear normal. No drainage. No middle ear effusion. Tympanic membrane is not bulging. Tympanic membrane has normal mobility. Nose: No septal deviation, mucosal edema or rhinorrhea. Mouth/Throat:      Lips: Pink. Mouth: Mucous membranes are moist.      Tongue: No lesions. Palate: No mass. Pharynx: Uvula midline. Tonsils: No tonsillar exudate. 1+ on the right. 1+ on the left. Eyes:      Pupils: Pupils are equal, round, and reactive to light. Neck:      Thyroid: No thyroid mass or thyromegaly.       Trachea: Trachea and phonation normal.   Cardiovascular: Pulses: Normal pulses. Pulmonary:      Effort: Pulmonary effort is normal. No accessory muscle usage or respiratory distress. Breath sounds: No stridor. Musculoskeletal:      Cervical back: Full passive range of motion without pain. Lymphadenopathy:      Head:      Right side of head: No submental or submandibular adenopathy. Left side of head: No submental or submandibular adenopathy. Cervical: No cervical adenopathy. Right cervical: No superficial, deep or posterior cervical adenopathy. Left cervical: No superficial, deep or posterior cervical adenopathy. Skin:     General: Skin is warm and dry. Neurological:      Mental Status: She is alert and oriented to person, place, and time. Cranial Nerves: No cranial nerve deficit. Coordination: Coordination normal.      Gait: Gait normal.   Psychiatric:         Thought Content: Thought content normal.           Procedure     Rigid Nasal Endoscopy    Preop:nasal congestion, facial pressure  Postop:same  Anes: topical lidocaine with afrin  Consent: verbal  Description:  After obtaining verbal consent from the patient 4% lidocaine with afrin was sprayed into the nasal cavities. After allowing a time for anesthesia, a nasal endoscope was placed into the naris. The septum, inferior, and middle turbinates were examined. The middle meatus, and sphenoethmoid recess was examined bilaterally. There were no complications. Pertinent positives included: There was not edema and purulence in the left middle meatus. There was not edema and purulence at the right middle meatus. Polyps were not identified. Masses were not identified. Tolerated well without complication. Assessment and Plan     1.  Allergic rhinitis, unspecified seasonality, unspecified trigger  -On oral antihistamine  -Has tried nasal steroid spray, nasal antihistamine and Atrovent without improvement  -Recommend trial of Singulair  -Patient would like to discuss this with her cardiologist due to concerns for interaction with medications    2. Migraine without status migrainosus, not intractable, unspecified migraine type  -Currently seeking new neurologist  -Suspect portion of her complaints are secondary to migraine    3. Atypical facial pain  -Provided reassurance there is no evidence of sphenoid infection on today's scope      Return as needed    Christelle Lobo, DO  9/1/21      Portions of this note were dictated using Dragon.  There may be linguistic errors secondary to the use of this program.

## 2021-09-01 NOTE — TELEPHONE ENCOUNTER
Pt's ENT is wanting her to take Singulair and pt wanted to make sure this was okay with [address]. Please advise.

## 2021-09-01 NOTE — TELEPHONE ENCOUNTER
AGK is pt okay to take singulair with her cardiac medications. Pt is currently taking propanolol 180 mg daily. Pt's last OV 3/11/2021. [address] [place],[address]  please advise.        TY

## 2021-09-03 ENCOUNTER — TELEPHONE (OUTPATIENT)
Dept: ENT CLINIC | Age: 31
End: 2021-09-03

## 2021-09-03 DIAGNOSIS — J30.9 ALLERGIC RHINITIS, UNSPECIFIED SEASONALITY, UNSPECIFIED TRIGGER: Primary | ICD-10-CM

## 2021-09-03 RX ORDER — MONTELUKAST SODIUM 10 MG/1
10 TABLET ORAL NIGHTLY
Qty: 30 TABLET | Refills: 3 | Status: SHIPPED | OUTPATIENT
Start: 2021-09-03 | End: 2021-09-28

## 2021-09-03 NOTE — TELEPHONE ENCOUNTER
Call placed to patient to relay Dr. Cruz Single message:    Please let patient know that I sent Singulair to her pharmacy- take one pill every night before bed. Follow up in 2 months. She verbalized understanding. Appointment was made for 11/05/2021.

## 2021-09-03 NOTE — TELEPHONE ENCOUNTER
Incoming call from patient. She stated that Dr. Samantha Angel needed her to confirm with another doctor that it would be ok for her to take Singular. She stated that the doctor did say it was ok. Patient can be reached at [phone].

## 2021-09-16 ENCOUNTER — TELEPHONE (OUTPATIENT)
Dept: FAMILY MEDICINE CLINIC | Age: 31
End: 2021-09-16

## 2021-09-16 DIAGNOSIS — G90.A POTS (POSTURAL ORTHOSTATIC TACHYCARDIA SYNDROME): Primary | ICD-10-CM

## 2021-09-16 NOTE — TELEPHONE ENCOUNTER
----- Message from Kingamady Sandy sent at 9/16/2021  9:52 AM EDT -----  Subject: Message to Provider    QUESTIONS  Information for Provider? Pt. needs a referral refaxed to [facility]. Pt. reports that the referral that was sent is specific to   [facility] and was recently sent by the nurse. Please note the fax number   listed below: [phone]  ---------------------------------------------------------------------------  --------------  CALL BACK INFO  What is the best way for the office to contact you? OK to leave message on   voicemail  Preferred Call Back Phone Number? [phone]  ---------------------------------------------------------------------------  --------------  SCRIPT ANSWERS  Relationship to Patient?  Self

## 2021-10-15 ENCOUNTER — TELEPHONE (OUTPATIENT)
Dept: NON INVASIVE DIAGNOSTICS | Age: 31
End: 2021-10-15

## 2021-10-15 RX ORDER — PROPRANOLOL HYDROCHLORIDE 120 MG/1
120 CAPSULE, EXTENDED RELEASE ORAL DAILY
Qty: 90 CAPSULE | Refills: 2 | Status: SHIPPED | OUTPATIENT
Start: 2021-10-15 | End: 2022-05-10 | Stop reason: SDUPTHER

## 2021-10-15 RX ORDER — PROPRANOLOL HYDROCHLORIDE 40 MG/1
40 TABLET ORAL 2 TIMES DAILY PRN
Qty: 90 TABLET | Refills: 2 | Status: SHIPPED | OUTPATIENT
Start: 2021-10-15 | End: 2022-01-18

## 2021-10-19 ENCOUNTER — VIRTUAL VISIT (OUTPATIENT)
Dept: FAMILY MEDICINE CLINIC | Age: 31
End: 2021-10-19
Payer: COMMERCIAL

## 2021-10-19 DIAGNOSIS — M25.50 ARTHRALGIA, UNSPECIFIED JOINT: ICD-10-CM

## 2021-10-19 DIAGNOSIS — M24.80 GENERALIZED HYPERMOBILITY OF JOINTS: ICD-10-CM

## 2021-10-19 DIAGNOSIS — B71.9 TAPEWORM: Primary | ICD-10-CM

## 2021-10-19 PROCEDURE — 99213 OFFICE O/P EST LOW 20 MIN: CPT | Performed by: FAMILY MEDICINE

## 2021-10-19 PROCEDURE — G8427 DOCREV CUR MEDS BY ELIG CLIN: HCPCS | Performed by: FAMILY MEDICINE

## 2021-10-19 RX ORDER — PRAZIQUANTEL 600 MG/1
1200 TABLET, FILM COATED ORAL 3 TIMES DAILY
Qty: 6 TABLET | Refills: 0 | Status: SHIPPED | OUTPATIENT
Start: 2021-10-19 | End: 2021-10-20

## 2021-10-19 NOTE — PROGRESS NOTES
Abnormal -    HENT:   [x] Normocephalic, atraumatic. [] Abnormal   [] Mouth/Throat: Mucous membranes are moist.     External Ears [x] Normal  [] Abnormal-     Neck: [x] No visualized mass     Pulmonary/Chest: [x] Respiratory effort normal.  [x] No visualized signs of difficulty breathing or respiratory distress        [] Abnormal-    Able to speak in full sentences without difficulty  Musculoskeletal:   [] Normal gait with no signs of ataxia         [x] Normal range of motion of neck        [] Abnormal-       Neurological:        [x] No Facial Asymmetry (Cranial nerve 7 motor function) (limited exam to video visit)          [x] No gaze palsy        [] Abnormal-         Skin:        [x] No significant exanthematous lesions or discoloration noted on facial skin         [] Abnormal-            Psychiatric:       [x] Normal Affect [] No Hallucinations        [] Abnormal-   Judgment, behavior, thought and mood are normal.          Time spent today included for this patient visit includes time spent preparing to see the patient  Including review of tests, labs and imaging,   revewing previous history and recent encounters,   obtaining and/or reviewing separately obtained history in care everywhere or record,   performing a medically appropriate examination and/or evaluation;   counseling and educating the patient   ordering medications, tests, or procedures;   referring to other health care specialists if applicable;   documenting clinical information in the electronic health record;   independently interpreting results (not separately reported)   and communicating results to the patient. Not billed on time      (During NDTER-22 public health emergency), evaluation of the following organ systems was limited: Vitals/Constitutional/EENT/Resp/CV/GI//MS/Neuro/Skin/Heme-Lymph-Imm.   Pursuant to the emergency declaration under the [facility] waiver authority and the Coronavirus Preparedness and Response Supplemental Appropriations Act, this Virtual Visit was conducted with patient's (and/or legal guardian's) consent, to reduce the patient's risk of exposure to COVID-19 and provide necessary medical care. The patient (and/or legal guardian) has also been advised to contact this office for worsening conditions or problems, and seek emergency medical treatment and/or call 911 if deemed necessary. Patient initiated the encounter and gave consent for the encounter. Services were provided through a video synchronous discussion virtually to substitute for in-person clinic visit.  Patient and provider were located at their individual homes

## 2021-11-04 ENCOUNTER — PATIENT MESSAGE (OUTPATIENT)
Dept: FAMILY MEDICINE CLINIC | Age: 31
End: 2021-11-04

## 2021-11-04 NOTE — TELEPHONE ENCOUNTER
From: Shyann Emery  To: Memo Stark DO  Sent: 11/4/2021 8:57 AM EDT  Subject: Non-Urgent Medical Question    I need to change my emergency contact to   Loree Irene   [phone]

## 2021-11-17 ENCOUNTER — VIRTUAL VISIT (OUTPATIENT)
Dept: ENT CLINIC | Age: 31
End: 2021-11-17
Payer: COMMERCIAL

## 2021-11-17 DIAGNOSIS — G43.909 MIGRAINE WITHOUT STATUS MIGRAINOSUS, NOT INTRACTABLE, UNSPECIFIED MIGRAINE TYPE: ICD-10-CM

## 2021-11-17 DIAGNOSIS — J30.9 ALLERGIC RHINITIS, UNSPECIFIED SEASONALITY, UNSPECIFIED TRIGGER: Primary | ICD-10-CM

## 2021-11-17 PROCEDURE — G8428 CUR MEDS NOT DOCUMENT: HCPCS | Performed by: OTOLARYNGOLOGY

## 2021-11-17 PROCEDURE — G8419 CALC BMI OUT NRM PARAM NOF/U: HCPCS | Performed by: OTOLARYNGOLOGY

## 2021-11-17 PROCEDURE — G8484 FLU IMMUNIZE NO ADMIN: HCPCS | Performed by: OTOLARYNGOLOGY

## 2021-11-17 PROCEDURE — 1036F TOBACCO NON-USER: CPT | Performed by: OTOLARYNGOLOGY

## 2021-11-17 PROCEDURE — 99213 OFFICE O/P EST LOW 20 MIN: CPT | Performed by: OTOLARYNGOLOGY

## 2021-11-17 NOTE — PROGRESS NOTES
Jenny Nicole is a 27 y.o. female evaluated via telephone on 11/17/2021. Consent:  She and/or health care decision maker is aware that that she may receive a bill for this telephone service, depending on her insurance coverage, and has provided verbal consent to proceed: Yes      Documentation:  I communicated with the patient and/or health care decision maker about persistent symptoms despite previous trials of Flonase, Astelin and Singulair. Discussed recent blood-tinged mucus. Discussed persistent headaches and her new neurology follow-up. She is desiring diagnosis for possible mast cell activation  Details of this discussion including any medical advice provided: Use of saline spray and Vaseline in the nose for epistaxis as well as humidifiers. Discussed ongoing symptoms of allergies and intolerance to all medications, recommend allergy testing. 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 Follow-up as needed      I affirm this is a Patient Initiated Episode with a Patient who has not had a related appointment within my department in the past 7 days or scheduled within the next 24 hours. Patient identification was verified at the start of the visit: Yes    Total Time: minutes: 5-10 minutes    The visit was conducted pursuant to the emergency declaration under the [address] waiver authority and the [employer] and [employer] General Act. Patient identification was verified, and a caregiver was present when appropriate. The patient was located in a state where the provider was credentialed to provide care.     Note: not billable if this call serves to triage the patient into an appointment for the relevant concern      Didi High DO

## 2021-11-22 ENCOUNTER — TELEPHONE (OUTPATIENT)
Dept: CARDIOLOGY CLINIC | Age: 31
End: 2021-11-22

## 2021-11-22 NOTE — TELEPHONE ENCOUNTER
LVM for patient. She should be taking propranolol 120 mg daily. And then 40 mg as needed, up to two times daily.

## 2021-11-22 NOTE — TELEPHONE ENCOUNTER
Pt needs to clarify what she should be taking of the propranolol (INDERAL LA) 120 MG extended release capsule and   propranolol (INDERAL) 40 MG tablet    Please advise.

## 2021-11-23 RX ORDER — CYANOCOBALAMIN 1000 UG/ML
1000 INJECTION INTRAMUSCULAR; SUBCUTANEOUS
Qty: 3 ML | Refills: 1 | Status: SHIPPED | OUTPATIENT
Start: 2021-11-23 | End: 2022-05-17

## 2022-01-11 ENCOUNTER — HOSPITAL ENCOUNTER (EMERGENCY)
Age: 32
Discharge: HOME | End: 2022-01-11
Payer: COMMERCIAL

## 2022-01-11 VITALS
SYSTOLIC BLOOD PRESSURE: 121 MMHG | OXYGEN SATURATION: 98 % | TEMPERATURE: 97.3 F | BODY MASS INDEX: 23.32 KG/M2 | HEIGHT: 65 IN | WEIGHT: 140 LBS | RESPIRATION RATE: 14 BRPM | HEART RATE: 101 BPM | DIASTOLIC BLOOD PRESSURE: 74 MMHG

## 2022-01-11 DIAGNOSIS — Z87.19 HISTORY OF IRRITABLE BOWEL SYNDROME: ICD-10-CM

## 2022-01-11 DIAGNOSIS — N30.01 ACUTE CYSTITIS WITH HEMATURIA: Primary | ICD-10-CM

## 2022-01-11 DIAGNOSIS — R73.9 HYPERGLYCEMIA: ICD-10-CM

## 2022-01-11 LAB
A/G RATIO: 2 (ref 1.1–2.2)
ALBUMIN SERPL-MCNC: 4.7 G/DL (ref 3.4–5)
ALP BLD-CCNC: 41 U/L (ref 40–129)
ALT SERPL-CCNC: 10 U/L (ref 10–40)
ANION GAP SERPL CALCULATED.3IONS-SCNC: 14 MMOL/L (ref 3–16)
AST SERPL-CCNC: 15 U/L (ref 15–37)
BACTERIA: ABNORMAL /HPF
BASOPHILS ABSOLUTE: 0 K/UL (ref 0–0.2)
BASOPHILS RELATIVE PERCENT: 0.4 %
BILIRUB SERPL-MCNC: 0.9 MG/DL (ref 0–1)
BILIRUBIN URINE: ABNORMAL
BLOOD, URINE: ABNORMAL
BUN BLDV-MCNC: 12 MG/DL (ref 7–20)
CALCIUM SERPL-MCNC: 8.9 MG/DL (ref 8.3–10.6)
CHLORIDE BLD-SCNC: 108 MMOL/L (ref 99–110)
CLARITY: ABNORMAL
CO2: 21 MMOL/L (ref 21–32)
COLOR: YELLOW
CREAT SERPL-MCNC: 0.7 MG/DL (ref 0.6–1.1)
EOSINOPHILS ABSOLUTE: 0 K/UL (ref 0–0.6)
EOSINOPHILS RELATIVE PERCENT: 0.3 %
EPITHELIAL CELLS, UA: ABNORMAL /HPF (ref 0–5)
GFR AFRICAN AMERICAN: >60
GFR NON-AFRICAN AMERICAN: >60
GLUCOSE BLD-MCNC: 108 MG/DL (ref 70–99)
GLUCOSE URINE: NEGATIVE MG/DL
HCG QUALITATIVE: NEGATIVE
HCT VFR BLD CALC: 39.1 % (ref 36–48)
HEMOGLOBIN: 13.3 G/DL (ref 12–16)
KETONES, URINE: 40 MG/DL
LEUKOCYTE ESTERASE, URINE: NEGATIVE
LYMPHOCYTES ABSOLUTE: 1.3 K/UL (ref 1–5.1)
LYMPHOCYTES RELATIVE PERCENT: 18.1 %
MCH RBC QN AUTO: 30.3 PG (ref 26–34)
MCHC RBC AUTO-ENTMCNC: 33.9 G/DL (ref 31–36)
MCV RBC AUTO: 89.3 FL (ref 80–100)
MICROSCOPIC EXAMINATION: YES
MONOCYTES ABSOLUTE: 0.4 K/UL (ref 0–1.3)
MONOCYTES RELATIVE PERCENT: 6.2 %
MUCUS: ABNORMAL /LPF
NEUTROPHILS ABSOLUTE: 5.4 K/UL (ref 1.7–7.7)
NEUTROPHILS RELATIVE PERCENT: 75 %
NITRITE, URINE: NEGATIVE
PDW BLD-RTO: 12.8 % (ref 12.4–15.4)
PH UA: 6 (ref 5–8)
PLATELET # BLD: 227 K/UL (ref 135–450)
PMV BLD AUTO: 8.5 FL (ref 5–10.5)
POTASSIUM SERPL-SCNC: 3.8 MMOL/L (ref 3.5–5.1)
PROTEIN UA: ABNORMAL MG/DL
RBC # BLD: 4.38 M/UL (ref 4–5.2)
RBC UA: ABNORMAL /HPF (ref 0–4)
SODIUM BLD-SCNC: 143 MMOL/L (ref 136–145)
SPECIFIC GRAVITY UA: >=1.03 (ref 1–1.03)
TOTAL PROTEIN: 7 G/DL (ref 6.4–8.2)
URINE REFLEX TO CULTURE: ABNORMAL
URINE TYPE: ABNORMAL
UROBILINOGEN, URINE: 0.2 E.U./DL
WBC # BLD: 7.2 K/UL (ref 4–11)
WBC UA: ABNORMAL /HPF (ref 0–5)

## 2022-01-11 PROCEDURE — 81001 URINALYSIS AUTO W/SCOPE: CPT

## 2022-01-11 PROCEDURE — 36415 COLL VENOUS BLD VENIPUNCTURE: CPT

## 2022-01-11 PROCEDURE — 99283 EMERGENCY DEPT VISIT LOW MDM: CPT

## 2022-01-11 PROCEDURE — 84703 CHORIONIC GONADOTROPIN ASSAY: CPT

## 2022-01-11 PROCEDURE — 85025 COMPLETE CBC W/AUTO DIFF WBC: CPT

## 2022-01-11 PROCEDURE — 80053 COMPREHEN METABOLIC PANEL: CPT

## 2022-01-11 PROCEDURE — 83036 HEMOGLOBIN GLYCOSYLATED A1C: CPT

## 2022-01-11 RX ORDER — NITROFURANTOIN 25; 75 MG/1; MG/1
100 CAPSULE ORAL 2 TIMES DAILY
Qty: 14 CAPSULE | Refills: 0 | Status: SHIPPED | OUTPATIENT
Start: 2022-01-11 | End: 2022-01-15

## 2022-01-11 ASSESSMENT — PAIN DESCRIPTION - LOCATION: LOCATION: ABDOMEN

## 2022-01-11 ASSESSMENT — PAIN DESCRIPTION - PAIN TYPE: TYPE: ACUTE PAIN

## 2022-01-11 NOTE — ED NOTES
Pt called her aunt to come  her 35 year old son, unable at this time, possibly around 1600. Pt states she does not trust her mother or other people to watch him.      Gibran Villafuerte, OCTAVIA  01/11/22 8079

## 2022-01-12 ENCOUNTER — VIRTUAL VISIT (OUTPATIENT)
Dept: FAMILY MEDICINE CLINIC | Age: 32
End: 2022-01-12
Payer: COMMERCIAL

## 2022-01-12 DIAGNOSIS — R73.9 HYPERGLYCEMIA: Primary | ICD-10-CM

## 2022-01-12 DIAGNOSIS — N30.00 ACUTE CYSTITIS WITHOUT HEMATURIA: ICD-10-CM

## 2022-01-12 PROCEDURE — 99213 OFFICE O/P EST LOW 20 MIN: CPT | Performed by: FAMILY MEDICINE

## 2022-01-12 PROCEDURE — G8428 CUR MEDS NOT DOCUMENT: HCPCS | Performed by: FAMILY MEDICINE

## 2022-01-12 NOTE — PROGRESS NOTES
TELEHEALTH EVALUATION -- Audio/Visual (During UBIBK-70 public health emergency)    HPI:  Denis Vargas (:  1990) is a 27 y.o. female,  here for evaluation of the following chief complaint(s): Other (ER follow up)      ASSESSMENT/PLAN:   Diagnosis Orders   1. Hyperglycemia  HEMOGLOBIN A1C   2. Acute cystitis without hematuria       Riya was seen today for other. Diagnoses and all orders for this visit:    Hyperglycemia  -     HEMOGLOBIN A1C; Future    Acute cystitis without hematuria  Cont treatment        SUBJECTIVE/OBJECTIVE:  HPI  Was in er yesterday with uti  bp and bs were high. She had not taken her propranolol yet that day, and her bp was up,and chart review shows it was normal with a 121/74 reading  There were no vitals filed for this visit. There is no height or weight on file to calculate BMI. Wt Readings from Last 3 Encounters:   22 140 lb (63.5 kg)   21 155 lb (70.3 kg)   21 153 lb 6.4 oz (69.6 kg)     BP Readings from Last 3 Encounters:   22 121/74   21 (!) 155/74   21 108/70            Review of Systems   As above  Allergic/Immunologic: Negative for immunocompromised state. Psychiatric/Behavioral: Negative for agitation, behavioral problems and confusion. Physical Exam    Constitutional: [x] Appears well-developed and well-nourished [x] No apparent distress      [] Abnormal-   Mental status  [x] Alert and awake  [x] Oriented to person/place/time [x]Able to follow commands      Eyes:  EOM    [x]  Normal  [] Abnormal-  Sclera  [x]  Normal  [] Abnormal -         Discharge [x]  None visible  [] Abnormal -    HENT:   [x] Normocephalic, atraumatic.   [] Abnormal   [] Mouth/Throat: Mucous membranes are moist.     External Ears [x] Normal  [] Abnormal-     Neck: [x] No visualized mass     Pulmonary/Chest: [x] Respiratory effort normal.  [x] No visualized signs of difficulty breathing or respiratory distress        [] Abnormal-    Able to speak in full sentences without difficulty  Musculoskeletal:   [] Normal gait with no signs of ataxia         [x] Normal range of motion of neck        [] Abnormal-       Neurological:        [x] No Facial Asymmetry (Cranial nerve 7 motor function) (limited exam to video visit)          [x] No gaze palsy        [] Abnormal-         Skin:        [x] No significant exanthematous lesions or discoloration noted on facial skin         [] Abnormal-            Psychiatric:       [x] Normal Affect [] No Hallucinations        [] Abnormal-   Judgment, behavior, thought and mood are normal.          Time spent today included for this patient visit includes time spent preparing to see the patient  Including review of tests, labs and imaging,   revewing previous history and recent encounters,   obtaining and/or reviewing separately obtained history in care everywhere or record,   performing a medically appropriate examination and/or evaluation;   counseling and educating the patient   ordering medications, tests, or procedures;   referring to other health care specialists if applicable;   documenting clinical information in the electronic health record;   independently interpreting results (not separately reported)   and communicating results to the patient. (During [facility]-54 public health emergency), evaluation of the following organ systems was limited: Vitals/Constitutional/EENT/Resp/CV/GI//MS/Neuro/Skin/Heme-Lymph-Imm. Pursuant to the emergency declaration under the [address] [place] [address] authority and the [employer] and [employer] Act, this Virtual Visit was conducted with patient's (and/or legal guardian's) consent, to reduce the patient's risk of exposure to COVID-19 and provide necessary medical care.   The patient (and/or legal guardian) has also been advised to contact this office for worsening conditions or problems, and seek emergency medical treatment and/or call 911 if deemed necessary. Patient initiated the encounter and gave consent for the encounter. Services were provided through a video synchronous discussion virtually to substitute for in-person clinic visit.  Patient and provider were located at their individual homes

## 2022-01-12 NOTE — PATIENT INSTRUCTIONS
Your fasting blood sugar should be below 100. If it is between 100-125 that is considered high and known as prediabetes, or  impaired glucose tolerance. If your blood sugar is too high, go to diabetes. org for a diabetes prevention diet. The A1c shows your average blood sugar over the previous 3 months. It is sometimes ordered if your blood sugar is elevated  You do not have to be fasting to get it drawn. If it is 5.6 or below it means your blood sugar is in the normal range  If between 5.7 and 6.5 it is impaired glucose tolerance. If it is above 6.5 it is consistent with the diagnosis of  diabetes. Decrease high carb foods if your blood sugar is high. High carbohydrate foods are:  Breads, muffins, rolls, and bagels  Pasta, rice, corn, and grains  Potatoes, sweet potatoes  Legumes: peas beans lentils. Milk ( almond milk ok)  Most fruit except berries  Cake cookies pies ice cream candy etc.  Juices, soda, sweetened ice tea  Beer. Protein   Best options: The American Diabetes Association (ADA) recommends lean proteins low in saturated fat, like fish or turkey. Aim for two servings of seafood each week; some fish, like salmon, have the added benefit of containing heart healthy omega-3 fats. For a vegetarian protein source, experiment with the wide variety of beans. Nuts, which are protein and healthy fats powerhouses, are also a choice.  Worst options: Processed deli meats and hot dogs have high amounts of fat along with lots of sodium, which can increase the risk of high blood pressure. Heart attack and stroke are two common complications of diabetes, so keeping blood pressure in check is important. Grains   Best options: When choosing grains, make sure theyre whole. Decrease the amount of foods which contain flour.   Whole grains such as wild rice, quinoa, and whole grain breads and cereals contain fiber, which is beneficial for digestive health, but often the grains flour products raise your blood sugar and when your blood sugar returns to normal, it can trigger the desire to eat again.   Worst options: Refined white flour doesnt contain the same health benefits as whole grains. Processed foods made with white flour include breakfast cereals, white bread, and pastries, cookies, muffins, pretzels, crackers, so avoid these options. Also try to steer clear of white rice and pasta. Dairy   Best options: With only 6 to 8 grams of carbohydrates in a serving, plain nonfat [place] yogurt is a healthy and versatile dairy option. You can add berries and enjoy it for dessert or breakfast; you can use it in recipes as a replacement for sour cream, which is high in saturated fat. Skim milk.  Worst options: Avoid all full-fat dairy products and especially packaged chocolate milk, as it also has added sugar. Avoid yogurts with added sugar. Vegetables   Best options: Non-starchy vegetables such as leafy greens, broccoli, cauliflower, asparagus, and carrots are low in carbohydrates and high in fiber and other nutrients, Jacquie Hi says. You can eat non-starchy vegetables in abundance  half of your plate should be filled with these veggies. If youre craving mashed potatoes, give mashed cauliflower a try.  Worst options: Stick to small portions of starchy vegetables such as corn, potatoes, and peas. These items are nutritious, but should be eaten in moderation. The ADA groups them with grains because of their high carb content. Fruit   Best options: Fresh fruit can conquer your craving for sweets while providing antioxidants and fiber. Berries are a great option because recommended portion sizes are typically generous, which may leave you feeling more satisfied   Worst options: Avoid added sugar by eliminating fruits canned in syrup, and be aware that dried fruits have a very high sugar concentration.  Also, fruit juices should be consumed rarely as theyre high in sugar and dont contain the same nutrients as whole fruit. Fats   Best options: Some types of fat actually help protect your heart. Choose the monounsaturated fats found in avocados, almonds, olives, and pecans or the polyunsaturated fats found in walnuts and sunflower oil, which can help to lower bad cholesterol.  Worst options: Saturated fats increase bad cholesterol, so limit butter, cheese, gravy, and fried foods. Keep calories from animal sources of saturated fat to less than 10 percent of your total daily intake. Trans fats are even worse than saturated fats, so avoid them completely. Look for the term   hydrogenated on labels of processed foods such as packaged snacks, baked goods, and crackers        Ideally your blood pressure goal should be below 130/80. I usually add or adjust medication if the blood pressure is consistently elevated above 140/90. You can learn more about blood pressure and ways to incorporate a healthy lifestlye to help treat it from the American Heart Association website: www.heart. org under the  Getting Healthy link, and the [facility] website: www.nhlbi.nih.gov/hbp

## 2022-01-13 LAB
ESTIMATED AVERAGE GLUCOSE: 91.1 MG/DL
HBA1C MFR BLD: 4.8 %

## 2022-01-13 ASSESSMENT — ENCOUNTER SYMPTOMS
DIARRHEA: 0
RHINORRHEA: 0
BACK PAIN: 0
NAUSEA: 0
VOMITING: 0
BLOOD IN STOOL: 0
COUGH: 0
SORE THROAT: 0
SHORTNESS OF BREATH: 0
EYE PAIN: 0
ABDOMINAL PAIN: 1

## 2022-01-14 NOTE — ED PROVIDER NOTES
[address] ED  EMERGENCY DEPARTMENT ENCOUNTER        Pt Name: Kira Jenkins  MRN: [id number]  [place] 1990  Date of evaluation: 1/11/2022  Provider: MAURILIO Miguel - ELISABETH  PCP: Dot Holcomb DO  Note Started: 11:33 PM EST      LINDSAY. I have evaluated this patient. My supervising physician was available for consultation. Triage CHIEF COMPLAINT       Chief Complaint   Patient presents with    Abdominal Pain     Pt states GI left the practice, Dr. Tree Guerrero.  Emesis    Constipation    Other     Pt reports that she got a [employer] and she thinks someone put something in it. She states a lot of people have been harrassing her, threatening to her.  Paranoid     Pt states people are following me, people are spying on me. Denies suicidal/homicidal ideations. HISTORY OF PRESENT ILLNESS   (Location/Symptom, Timing/Onset, Context/Setting, Quality, Duration, Modifying Factors, Severity)  Note limiting factors. Chief Complaint: Abdominal pain    Kira Jenkins is a 27 y.o. female who presents to the emerged part with symptoms of general abdominal pain. Patient states that symptoms began approximately 4 days ago. States that she recently moved back to the area and is nervous about the area because she apparently had a past where she had a traumatic event. Patient states that she is nervous about being back in [place]. She states that she has no thoughts of wanting hurt herself or anyone else. States that she has been mildly anxious. She states that she has been having symptoms of some intermittent abdominal cramping but nothing severe. She denies any fevers or chills. Mild burning with urination but otherwise feeling well. No other acute complaints    Nursing Notes were all reviewed and agreed with or any disagreements were addressed in the HPI.     REVIEW OF SYSTEMS    (2-9 systems for level 4, 10 or more for level 5)     Review of Systems   Constitutional: Negative for chills, diaphoresis and fever. HENT: Negative for congestion, ear pain, rhinorrhea and sore throat. Eyes: Negative for pain and visual disturbance. Respiratory: Negative for cough and shortness of breath. Cardiovascular: Negative for chest pain and leg swelling. Gastrointestinal: Positive for abdominal pain. Negative for blood in stool, diarrhea, nausea and vomiting. Genitourinary: Negative for difficulty urinating, dysuria, flank pain and frequency. Musculoskeletal: Negative for back pain and neck pain. Skin: Negative for rash and wound. Neurological: Negative for dizziness and light-headedness.        PAST MEDICAL HISTORY     Past Medical History:   Diagnosis Date    Acid reflux     Endometriosis 2014    Hypertension     POTS (postural orthostatic tachycardia syndrome)     Small fiber neuropathy        SURGICAL HISTORY     Past Surgical History:   Procedure Laterality Date    BREAST CYST EXCISION Right 2017     SECTION  2019    Uterine extension    NASAL SEPTUM SURGERY  2018    OVARIAN CYST REMOVAL Right 2014    SINUS SURGERY      UPPER GASTROINTESTINAL ENDOSCOPY N/A 2020    EGD BIOPSY performed by Sunny Thapa MD at [facility]         [address]       Discharge Medication List as of 2022 11:38 AM      CONTINUE these medications which have NOT CHANGED    Details   cyanocobalamin 1000 MCG/ML injection INJECT 1 ML INTO THE MUSCLE EVERY 30 DAYS, Disp-3 mL, R-1Normal      propranolol (INDERAL LA) 120 MG extended release capsule Take 1 capsule by mouth daily, Disp-90 capsule, R-2Normal      propranolol (INDERAL) 40 MG tablet Take 1 tablet by mouth 2 times daily as needed (palpitations), Disp-90 tablet, R-2Normal      montelukast (SINGULAIR) 10 MG tablet TAKE 1 TABLET BY MOUTH EVERY DAY AT NIGHT, Disp-30 tablet, R-6Normal      FLUCONAZOLE PO Take by mouthHistorical Med      valACYclovir (VALTREX) 500 MG tablet Take 500 mg by mouth 2 times dailyHistorical Med      bisacodyl (BISACODYL) 5 MG EC tablet Use as directed for colonoscopy prep, Disp-4 tablet, R-0Normal      magnesium citrate solution Take 296 mLs by mouth once for 1 dose For colonoscopy prep, Disp-296 mL, R-0Normal      polyethylene glycol (GLYCOLAX) 17 GM/SCOOP powder Use as directed for colonoscopy prep, Disp-238 g, R-1Normal      Cranberry-Vitamin C (AZO CRANBERRY URINARY TRACT) 250-60 MG CAPS Take by mouthHistorical Med      Magnesium 80 MG TABS Take by mouthHistorical Med      albuterol sulfate  (90 Base) MCG/ACT inhaler Inhale 2 puffs into the lungs every 6 hours as neededHistorical Med      Syringe/Needle, Disp, (SYRINGE 3CC/25GX5/8\") 25G X 5/8\" 3 ML MISC Disp-1 each, R-11, NormalUse as directed to administer B12 once a month             ALLERGIES     Patient has no known allergies.     FAMILYHISTORY       Family History   Problem Relation Age of Onset    Breast Cancer Maternal Grandmother     Heart Attack Paternal Grandfather     Hypertension Paternal Grandfather     Diabetes Paternal Grandfather     No Known Problems Mother     No Known Problems Father         SOCIAL HISTORY       Social History     Socioeconomic History    Marital status: Single     Spouse name: None    Number of children: None    Years of education: None    Highest education level: None   Occupational History    None   Tobacco Use    Smoking status: Former Smoker     Quit date: 2014     Years since quittin.6    Smokeless tobacco: Never Used   Vaping Use    Vaping Use: Former   Substance and Sexual Activity    Alcohol use: Yes     Comment: occas    Drug use: Yes     Types: Marijuana [place])    Sexual activity: Not Currently     Birth control/protection: Other-see comments   Other Topics Concern    None   Social History Narrative    None     Social Determinants of Health     Financial Resource Strain: Low Risk     Difficulty of effort is normal. No respiratory distress. Abdominal:      General: Abdomen is flat. Bowel sounds are normal. There is no distension. Palpations: Abdomen is soft. Tenderness: There is no abdominal tenderness. Musculoskeletal:         General: Normal range of motion. Cervical back: Normal range of motion and neck supple. Skin:     General: Skin is warm and dry. Neurological:      General: No focal deficit present. Mental Status: She is alert and oriented to person, place, and time.    Psychiatric:         Mood and Affect: Mood normal.         Behavior: Behavior normal.         DIAGNOSTIC RESULTS   LABS:    Labs Reviewed   COMPREHENSIVE METABOLIC PANEL - Abnormal; Notable for the following components:       Result Value    Glucose 108 (*)     All other components within normal limits    Narrative:     Performed at:  [place] [address],  [place]   Phone [phone]   URINE RT REFLEX TO CULTURE - Abnormal; Notable for the following components:    Clarity, UA SL CLOUDY (*)     Bilirubin Urine MODERATE (*)     Ketones, Urine 40 (*)     Blood, Urine TRACE-INTACT (*)     Protein, UA TRACE (*)     All other components within normal limits    Narrative:     Performed at:  [place] [address],  [employer] [facility]   Phone [phone]   MICROSCOPIC URINALYSIS - Abnormal; Notable for the following components:    Mucus, UA 4+ (*)     RBC, UA 5-10 (*)     Epithelial Cells, UA 11-20 (*)     Bacteria, UA 3+ (*)     All other components within normal limits    Narrative:     Performed at:  [place] [address],  [employer], [employer]   Phone [phone]   CBC WITH AUTO DIFFERENTIAL    Narrative:     Performed at:  [facility],  [employer], [employer]   Phone [phone]   HCG, SERUM, QUALITATIVE    Narrative: herself or anyone else. She is answering questions appropriately. She will be treated for her mild UTI. Patient also advised to follow-up with her family doctor for further evaluation and treatment. She has been advised to return back to emergency room she has any further acute concerns. FINAL IMPRESSION      1. Acute cystitis with hematuria    2. History of irritable bowel syndrome    3.  Hyperglycemia          DISPOSITION/PLAN   DISPOSITION Decision To Discharge 01/11/2022 11:32:21 AM      PATIENT REFERREDTO:  Arianna Melo DO  [address] [place] [address]  [phone]    Schedule an appointment as soon as possible for a visit       Bong Maxwell DO  [address] [facility]  [phone]            DISCHARGE MEDICATIONS:  Discharge Medication List as of 1/11/2022 11:38 AM      START taking these medications    Details   nitrofurantoin, macrocrystal-monohydrate, (MACROBID) 100 MG capsule Take 1 capsule by mouth 2 times daily for 7 doses, Disp-14 capsule, R-0Print             DISCONTINUED MEDICATIONS:  Discharge Medication List as of 1/11/2022 11:38 AM                 (Please note that portions ofthis note were completed with a voice recognition program.  Efforts were made to edit the dictations but occasionally words are mis-transcribed.)    MAURILIO Acosta - CNP (electronically signed)            MAURILIO Acosta - ELISABETH  01/13/22 [id number]

## 2022-01-15 DIAGNOSIS — G90.A POTS (POSTURAL ORTHOSTATIC TACHYCARDIA SYNDROME): Primary | ICD-10-CM

## 2022-01-18 NOTE — TELEPHONE ENCOUNTER
1/2022 CMP   Last visit 3/2021 [facility] (f/u 1 year, next visit 3/2022). Pending to [address] [place] [address]. No refills.

## 2022-01-19 RX ORDER — PROPRANOLOL HYDROCHLORIDE 40 MG/1
40 TABLET ORAL 2 TIMES DAILY PRN
Qty: 180 TABLET | Refills: 0 | Status: SHIPPED | OUTPATIENT
Start: 2022-01-19 | End: 2022-04-25

## 2022-02-01 ENCOUNTER — VIRTUAL VISIT (OUTPATIENT)
Dept: FAMILY MEDICINE CLINIC | Age: 32
End: 2022-02-01
Payer: COMMERCIAL

## 2022-02-01 DIAGNOSIS — M54.2 CERVICAL PAIN: ICD-10-CM

## 2022-02-01 DIAGNOSIS — N30.00 ACUTE CYSTITIS WITHOUT HEMATURIA: Primary | ICD-10-CM

## 2022-02-01 PROCEDURE — G8420 CALC BMI NORM PARAMETERS: HCPCS | Performed by: NURSE PRACTITIONER

## 2022-02-01 PROCEDURE — 99213 OFFICE O/P EST LOW 20 MIN: CPT | Performed by: NURSE PRACTITIONER

## 2022-02-01 PROCEDURE — G8484 FLU IMMUNIZE NO ADMIN: HCPCS | Performed by: NURSE PRACTITIONER

## 2022-02-01 PROCEDURE — G8427 DOCREV CUR MEDS BY ELIG CLIN: HCPCS | Performed by: NURSE PRACTITIONER

## 2022-02-01 PROCEDURE — 1036F TOBACCO NON-USER: CPT | Performed by: NURSE PRACTITIONER

## 2022-02-01 RX ORDER — SULFAMETHOXAZOLE AND TRIMETHOPRIM 800; 160 MG/1; MG/1
1 TABLET ORAL 2 TIMES DAILY
Qty: 14 TABLET | Refills: 0 | Status: SHIPPED | OUTPATIENT
Start: 2022-02-01 | End: 2022-02-08

## 2022-02-01 ASSESSMENT — ENCOUNTER SYMPTOMS
SHORTNESS OF BREATH: 0
VOMITING: 0
COUGH: 0
DIARRHEA: 0
BACK PAIN: 1
NAUSEA: 0

## 2022-02-01 NOTE — PATIENT INSTRUCTIONS
Start Bactrim (antibiotic)  If urinary symptoms are no improved next week please follow-up in office  Neck x-ray ordered and you can get that done in the imaging suite in my office no appointment is needed  I put in a referral to physical therapy for your neck pain

## 2022-02-01 NOTE — PROGRESS NOTES
Positive for dysuria, frequency, pelvic pain and urgency. Musculoskeletal: Positive for back pain and neck pain. Neurological: Negative for dizziness and headaches. All other systems reviewed and are negative.          Objective   Patient-Reported Vitals  No data recorded     Physical Exam  [INSTRUCTIONS:  \"[x]\" Indicates a positive item  \"[]\" Indicates a negative item  -- DELETE ALL ITEMS NOT EXAMINED]    Constitutional: [x] Appears well-developed and well-nourished [x] No apparent distress      [] Abnormal -     Mental status: [x] Alert and awake  [x] Oriented to person/place/time [x] Able to follow commands    [] Abnormal -     Eyes:   EOM    [x]  Normal    [] Abnormal -   Sclera  [x]  Normal    [] Abnormal -          Discharge [x]  None visible   [] Abnormal -     HENT: [x] Normocephalic, atraumatic  [] Abnormal -   [x] Mouth/Throat: Mucous membranes are moist    External Ears [x] Normal  [] Abnormal -    Neck: [x] No visualized mass [] Abnormal -     Pulmonary/Chest: [x] Respiratory effort normal   [x] No visualized signs of difficulty breathing or respiratory distress        [] Abnormal -      Musculoskeletal:   [x] Normal gait with no signs of ataxia         [x] Normal range of motion of neck        [] Abnormal -     Neurological:        [x] No Facial Asymmetry (Cranial nerve 7 motor function) (limited exam due to video visit)          [x] No gaze palsy        [] Abnormal -          Skin:        [x] No significant exanthematous lesions or discoloration noted on facial skin         [] Abnormal -            Psychiatric:       [x] Normal Affect [] Abnormal -        [x] No Hallucinations    Other pertinent observable physical exam findings:-             On this date 2/1/2022 I have spent 21 minutes reviewing previous notes, test results and face to face (virtual) with the patient discussing the diagnosis and importance of compliance with the treatment plan as well as documenting on the day of the visit.    Bryce Deshpande, was evaluated through a synchronous (real-time) audio-video encounter. The patient (or guardian if applicable) is aware that this is a billable service, which includes applicable co-pays. This Virtual Visit was conducted with patient's (and/or legal guardian's) consent. The visit was conducted pursuant to the emergency declaration under the [address] [place] [address] authority and the [employer] Act. Patient identification was verified, and a caregiver was present when appropriate. The patient was located at home in a state where the provider was licensed to provide care.        --Porter Callejas, MAURLIIO - CNP

## 2022-03-04 ENCOUNTER — PATIENT MESSAGE (OUTPATIENT)
Dept: FAMILY MEDICINE CLINIC | Age: 32
End: 2022-03-04

## 2022-03-04 NOTE — TELEPHONE ENCOUNTER
From: Judy Lowe  To:  Peggy Reyes  Sent: 3/4/2022 7:08 AM EST  Subject: Switch primary    I would like to switch my primary care provider to someone different in the office

## 2022-04-25 DIAGNOSIS — G90.A POTS (POSTURAL ORTHOSTATIC TACHYCARDIA SYNDROME): ICD-10-CM

## 2022-04-25 RX ORDER — PROPRANOLOL HYDROCHLORIDE 40 MG/1
40 TABLET ORAL 2 TIMES DAILY PRN
Qty: 180 TABLET | Refills: 0 | Status: SHIPPED | OUTPATIENT
Start: 2022-04-25 | End: 2022-07-25

## 2022-05-10 ENCOUNTER — OFFICE VISIT (OUTPATIENT)
Dept: CARDIOLOGY CLINIC | Age: 32
End: 2022-05-10
Payer: COMMERCIAL

## 2022-05-10 VITALS
SYSTOLIC BLOOD PRESSURE: 104 MMHG | WEIGHT: 149 LBS | OXYGEN SATURATION: 98 % | HEART RATE: 63 BPM | DIASTOLIC BLOOD PRESSURE: 62 MMHG | HEIGHT: 65 IN | BODY MASS INDEX: 24.83 KG/M2

## 2022-05-10 DIAGNOSIS — G90.A POTS (POSTURAL ORTHOSTATIC TACHYCARDIA SYNDROME): Primary | ICD-10-CM

## 2022-05-10 PROCEDURE — 1036F TOBACCO NON-USER: CPT | Performed by: INTERNAL MEDICINE

## 2022-05-10 PROCEDURE — 99214 OFFICE O/P EST MOD 30 MIN: CPT | Performed by: INTERNAL MEDICINE

## 2022-05-10 PROCEDURE — G8420 CALC BMI NORM PARAMETERS: HCPCS | Performed by: INTERNAL MEDICINE

## 2022-05-10 PROCEDURE — 93000 ELECTROCARDIOGRAM COMPLETE: CPT | Performed by: INTERNAL MEDICINE

## 2022-05-10 PROCEDURE — G8427 DOCREV CUR MEDS BY ELIG CLIN: HCPCS | Performed by: INTERNAL MEDICINE

## 2022-05-10 RX ORDER — PROPRANOLOL HYDROCHLORIDE 120 MG/1
120 CAPSULE, EXTENDED RELEASE ORAL DAILY
Qty: 90 CAPSULE | Refills: 3 | Status: SHIPPED | OUTPATIENT
Start: 2022-05-10 | End: 2022-08-08

## 2022-05-10 NOTE — PATIENT INSTRUCTIONS
RECOMMENDATIONS:  1. Continue cardiac medications prescribed. 2. Encourage continued activity as tolerated.    3. Follow up with EP NP in 1 year

## 2022-05-10 NOTE — PROGRESS NOTES
[address]   Electrophysiology Note    Date: 5/10/22  Patient Name: Donna Roche  YOB: 1990    Primary Care Physician: No primary care provider on file. CHIEF COMPLAINT:   Chief Complaint   Patient presents with    1 Year Follow Up    Tachycardia     POTS     HISTORY OF PRESENT ILLNESS: Donna Roche is a 34 y.o. female who initally presented for follow up for POTS (2018). She has a PMH of low Vit B12 (that she gets B 12 injections), IBS, HTN,, and small fiber neuropathy. An Echocardiogram from 7/28/10 [facility]) demonstrated EF 55%. She stated she had a positive tilt table test.  She quit smoking 6 years ago. She does not use any illegal drugs and only drinks maybe twice a year. Echo on 6/10/2020 shows an EF of 55%. At her office visit on 9/3/20 she reported her SOB had improved but was still experiencing palpitations and dizziness with her anxiety. On 12/8/20, she reports she was doing well. She reported her propranolol every day in the morning and if she is having a lot of issues with her POTS that day and her HR, BP, and dizziness occur, she will take it again about 6 hours later. She felt that the propranolol has helped her POTS. She reported she had not had a migraine for 2 months. Dr Abril Garcia at [address] [place] [address] is her neurologist. He wanted to double check with our office to see if increasing her propranolol would be an option. At 3/11/2021 office visit, she reported the propranolol has been helpful to her migraines. She reported that she continues to have no migraines, and no POTS symptoms other than perspiring occasionally. Interval History Since Last Office Visit: At the conclusion of 3/2021 office visit, patient's propranolol was increased. Today, patient's ECG demonstrates SR 63 BPM. She reports that she has been doing well. She is only getting migraines 1-2 times per month. She is eating healthy and doing mild-moderate exercises 3-5 times weekly.  She reports she has been tolerating activity well. Past Medical History:   has a past medical history of Acid reflux, Endometriosis, Hypertension, POTS (postural orthostatic tachycardia syndrome), and Small fiber neuropathy. Past Surgical History:   has a past surgical history that includes [place] tooth extraction (); ovarian cyst removal (Right, 2014); Nasal septum surgery (2018);  section (2019); Breast cyst excision (Right, 2017); Upper gastrointestinal endoscopy (N/A, 2020); and sinus surgery. Social History:   reports that she quit smoking about 7 years ago. She has never used smokeless tobacco. She reports current alcohol use. She reports current drug use. Drug: Marijuana Meg Kales). Family History: family history includes Breast Cancer in her maternal grandmother; Diabetes in her paternal grandfather; Heart Attack in her paternal grandfather; Hypertension in her paternal grandfather; No Known Problems in her father and mother. Allergies:  Patient has no known allergies. Home Medications:    Prior to Admission medications    Medication Sig Start Date End Date Taking?  Authorizing Provider   propranolol (INDERAL) 40 MG tablet TAKE 1 TABLET BY MOUTH 2 TIMES DAILY AS NEEDED (PALPITATIONS) 22  Yes NEEL Fonseca MD   cyanocobalamin 1000 MCG/ML injection INJECT 1 ML INTO THE MUSCLE EVERY 30 DAYS 21  Yes Amisha Lu,    propranolol (INDERAL LA) 120 MG extended release capsule Take 1 capsule by mouth daily 10/15/21 5/10/22 Yes Arnol Rutherford MD   valACYclovir (VALTREX) 500 MG tablet Take 500 mg by mouth 2 times daily   Yes Historical Provider, MD   polyethylene glycol (GLYCOLAX) 17 GM/SCOOP powder Use as directed for colonoscopy prep 21  Yes Derick Gayle MD   Cranberry-Vitamin C (AZO CRANBERRY URINARY TRACT) 250-60 MG CAPS Take by mouth   Yes Historical Provider, MD   Magnesium 80 MG TABS Take by mouth   Yes Historical Provider, MD   albuterol sulfate  (90 Base) MCG/ACT inhaler Inhale 2 puffs into the lungs every 6 hours as needed 5/1/20  Yes Historical Provider, MD   Syringe/Needle, Disp, (SYRINGE 3CC/25GX5/8\") 25G X 5/8\" 3 ML MISC Use as directed to administer B12 once a month 5/5/20  Yes Niraj Lopez,    montelukast (SINGULAIR) 10 MG tablet TAKE 1 TABLET BY MOUTH EVERY DAY AT NIGHT 9/28/21   Eliud Dyson,    FLUCONAZOLE PO Take by mouth    Historical Provider, MD   bisacodyl (BISACODYL) 5 MG EC tablet Use as directed for colonoscopy prep 8/13/21   Karla Tavarez MD   magnesium citrate solution Take 296 mLs by mouth once for 1 dose For colonoscopy prep 8/13/21 10/18/21  Karla Tavarez MD       REVIEW OF SYSTEMS:      All 14-point review of systems are completed and pertinent positives are mentioned in the history of present illness. Other systems are reviewed and are negative. Physical Examination:    /62   Pulse 63   Ht 5' 5\" (1.651 m)   Wt 149 lb (67.6 kg)   SpO2 98%   BMI 24.79 kg/m²    Constitutional and General Appearance: alert, cooperative, no distress and appears stated age  [de-identified]: PERRL, no cervical lymphadenopathy. No masses palpable. Normal oral mucosa  Respiratory:  · Normal excursion and expansion without use of accessory muscles  · Resp Auscultation: Normal breath sounds without dullness or wheezing  Cardiovascular:  · The apical impulse is not displaced  · RRR S1S2 w/o M/G/R  Abdomen:  · No masses or tenderness  · Bowel sounds present  Extremities:  ·  No Cyanosis or Clubbing  ·  Lower extremity edema: No  · Skin: Warm and dry  Neurological:  · Alert and oriented. · Moves all extremities well  · No abnormalities of mood, affect, memory, mentation, or behavior are noted    DATA:    ECG 5/10/22: Personally reviewed. ECHO: 6/10/2020   Summary   Normal left ventricular size and wall thickness. Normal left ventricular   systolic function with an estimate ejection fraction of 55%.  There are no   regional wall motion abnormalities. Normal diastolic function. No significant valvular regurgitation or stenosis. IMPRESSION:    1. POTS (postural orthostatic tachycardia syndrome)  05/26/2020  Patient is a pleasant 69-year-old female with a history of tilt table diagnosed pots, and small fiber neuropathy who is being evaluated for symptomatic tachycardia with palpitations. Resting heart rate was around 60 however control into the 120s when she is mildly active. I am unclear at this point how symptomatic she is with her pots. She had a repeat tilt table test done by the [facility] which was negative for pots. He is having some volume overload complaints with swelling in her legs and her arms. She is post pregnancy and so I am concerned for post partum cardiomyopathy. I would like to start with an ultrasound and some lab work to see/make sure no other potential physiology occurring that may explain her symptoms. After her ultrasound and lab work we will consider management of her pots which will include increase fluid intake, increase salt intake, and possibly augmentation with medication such as fludrocortisone or midodrine. We will follow-up with her for her labs. 07/02/2020  Labs reassuring. Echocardiogram reassuring. Patient having symptoms without air conditioning. Once restored then will let us know how she is doing.  - Follow up in 6-8 weeks. - Let us know how doing with restoration of air conditioning.     3/11/2021  Patient presents for follow up. Her symptoms have improved significant on propanolol. Her headaches have also improved. Her POTS symptoms have also improved. We discussed increasing her propanolol to see if will help her symptoms and headaches. If any adverse changes we will go back to 80 mg of her propanolol.  - Increase propanolol to 120 mg daily.  - Follow up with EP NP in 1 year unless/until procedure/discussion required or PRN. 5/10/2022  Patient has been doing well.   She has tolerated increase in propanolol and headahces have improved. She is exercising more and feels healthier.    - Increase propanolol to 120 mg daily.  - Follow up with EP NP in 1 year unless/until procedure/discussion required or PRN    2. Shortness of breath. 05/26/2020  Etiology unclear. Occurs with exertion. May be sinus tachycardia, inappropriate sinus tachycardia, or postural orthostatic tachycardia. I suspect it is most likely related to deconditioning, however we will work-up given the severity of her symptoms. 03/11/2021 - 05/10/2022  Improved. - Plan as per above.     3. Swelling. 05/26/2020  Etiology unclear. May be related to prednisone however she states that temporarily it started well before she got started on her prednisone. Plan as per above.    03/11/2021 - 05/10/2022  Resolved with propanolol.  - Plan as per above.     RECOMMENDATIONS:  1. Continue cardiac medications prescribed. 2. Encourage continued activity as tolerated. 3. Follow up with EP NP in 1 year     QUALITY MEASURES  1. Tobacco Cessation Counseling: NA  2. Retake of BP if >140/90:   NA  3. Documentation to PCP/referring for new patient:  Sent to PCP at close of office visit  4. CAD patient on anti-platelet: NA  5. CAD patient on STATIN therapy:  NA  6. Patient with CHF and aFib on anticoagulation:  NA     All questions and concerns were addressed to the patient/family. Alternatives to my treatment were discussed. This note has been scribed in the presence of Elena Iqbal MD, by Ana Reich RN. I reviewed with the resident the medical history and the resident's findings on the physical examination. I discussed with the resident the patient's diagnosis and concur with the plan. Dr. Alysha Burch MD  Electrophysiology  [facility]. [address] [place] [address]. [address].   [place] [address]  Phone: [phone]  Fax: [phone]   5/10/2022     NOTE: This report was transcribed using voice recognition software. Every effort was made to ensure accuracy, however, inadvertent computerized transcription errors may be present.

## 2022-05-17 RX ORDER — CYANOCOBALAMIN 1000 UG/ML
1000 INJECTION INTRAMUSCULAR; SUBCUTANEOUS
Qty: 3 ML | Refills: 1 | Status: SHIPPED | OUTPATIENT
Start: 2022-05-17

## 2022-06-22 ENCOUNTER — PATIENT MESSAGE (OUTPATIENT)
Dept: FAMILY MEDICINE CLINIC | Age: 32
End: 2022-06-22

## 2022-06-22 NOTE — TELEPHONE ENCOUNTER
From: Fozia Rueda  Sent: 6/22/2022 3:45 PM EDT  To: Debby Su  Practice Support  Subject: Depression Follow up    I have been going to this office for over a year and that is definitely not a policy of yours or at any general practitioner I have been to. You also started it off by saying I needed a follow up for my depression. Not that it was a yearly check up for everyone. Im not interested and have been evaluated by two specialists within the past year who have also done a depression check. Thank you!

## 2022-06-29 DIAGNOSIS — J30.0 VASOMOTOR RHINITIS: ICD-10-CM

## 2022-06-29 RX ORDER — IPRATROPIUM BROMIDE 21 UG/1
SPRAY, METERED NASAL
Refills: 1 | OUTPATIENT
Start: 2022-06-29

## 2022-07-25 DIAGNOSIS — G90.A POTS (POSTURAL ORTHOSTATIC TACHYCARDIA SYNDROME): ICD-10-CM

## 2022-07-25 RX ORDER — PROPRANOLOL HYDROCHLORIDE 40 MG/1
40 TABLET ORAL 2 TIMES DAILY PRN
Qty: 180 TABLET | Refills: 3 | Status: SHIPPED | OUTPATIENT
Start: 2022-07-25

## 2022-07-25 NOTE — TELEPHONE ENCOUNTER
Pt/pharmacy requesting propanolol 40 mg tab.  Pending script sent to [facility]     LOV  5/10/22  Last EKG 5/10/22  NO upcoming OV scheduled at this time

## 2022-11-21 NOTE — TELEPHONE ENCOUNTER
LOV 2/1/2022    Future Appointments   Date Time Provider Evangelista Khalil   5/2/2023  3:30 PM MD Ori Batresne Malou MARTIN

## 2022-11-22 RX ORDER — CYANOCOBALAMIN 1000 UG/ML
1000 INJECTION, SOLUTION INTRAMUSCULAR; SUBCUTANEOUS
Qty: 3 ML | Refills: 1 | Status: SHIPPED | OUTPATIENT
Start: 2022-11-22

## 2023-02-25 DIAGNOSIS — J30.0 VASOMOTOR RHINITIS: ICD-10-CM

## 2023-02-27 RX ORDER — IPRATROPIUM BROMIDE 21 UG/1
SPRAY, METERED NASAL
Refills: 1 | OUTPATIENT
Start: 2023-02-27

## 2023-05-02 ENCOUNTER — TELEPHONE (OUTPATIENT)
Dept: CARDIOLOGY CLINIC | Age: 33
End: 2023-05-02

## 2023-05-02 DIAGNOSIS — G90.A POTS (POSTURAL ORTHOSTATIC TACHYCARDIA SYNDROME): Primary | ICD-10-CM

## 2023-05-02 DIAGNOSIS — R60.9 EDEMA, UNSPECIFIED TYPE: ICD-10-CM

## 2023-05-02 NOTE — TELEPHONE ENCOUNTER
Pt presented herself in office today and was late for [address] [place] [address] appt by 17 minutes and AGK was unable to see pt, pt stated she has been having swelling all over for 2 weeks w/ CP.  She is requesting a VV or call from [address] [place] [address],[facility] 200 personally, per PMKW will route directly to [address] [place] [address]

## 2023-05-05 NOTE — TELEPHONE ENCOUNTER
I spoke with patient. I will order labs. Please over book her on 03/25/2023. I can see alone if need be. Plan for possible echo after visit.

## 2023-05-05 NOTE — TELEPHONE ENCOUNTER
Clarified date with [address], 5/25/2023. Added on for 5/25/2023 @ 12:15. Patient aware.

## 2023-05-16 ENCOUNTER — HOSPITAL ENCOUNTER (OUTPATIENT)
Age: 33
Discharge: HOME | End: 2023-05-16
Payer: COMMERCIAL

## 2023-05-16 DIAGNOSIS — R60.9 EDEMA, UNSPECIFIED TYPE: ICD-10-CM

## 2023-05-16 DIAGNOSIS — G90.A POTS (POSTURAL ORTHOSTATIC TACHYCARDIA SYNDROME): ICD-10-CM

## 2023-05-16 LAB
DEPRECATED RDW RBC AUTO: 13.6 % (ref 12.4–15.4)
HCT VFR BLD AUTO: 38.8 % (ref 36–48)
HGB BLD-MCNC: 13.2 G/DL (ref 12–16)
MCH RBC QN AUTO: 31.7 PG (ref 26–34)
MCHC RBC AUTO-ENTMCNC: 34 G/DL (ref 31–36)
MCV RBC AUTO: 93.5 FL (ref 80–100)
PLATELET # BLD AUTO: 243 K/UL (ref 135–450)
PMV BLD AUTO: 9.9 FL (ref 5–10.5)
RBC # BLD AUTO: 4.15 M/UL (ref 4–5.2)
WBC # BLD AUTO: 7.6 K/UL (ref 4–11)

## 2023-05-16 PROCEDURE — 85027 COMPLETE CBC AUTOMATED: CPT

## 2023-05-16 PROCEDURE — 80053 COMPREHEN METABOLIC PANEL: CPT

## 2023-05-16 PROCEDURE — 36415 COLL VENOUS BLD VENIPUNCTURE: CPT

## 2023-05-17 ENCOUNTER — TELEPHONE (OUTPATIENT)
Dept: CARDIOLOGY CLINIC | Age: 33
End: 2023-05-17

## 2023-05-17 LAB
ALBUMIN SERPL-MCNC: 4.4 G/DL (ref 3.4–5)
ALBUMIN/GLOB SERPL: 2 {RATIO} (ref 1.1–2.2)
ALP SERPL-CCNC: 32 U/L (ref 40–129)
ALT SERPL-CCNC: 11 U/L (ref 10–40)
ANION GAP SERPL CALCULATED.3IONS-SCNC: 17 MMOL/L (ref 3–16)
AST SERPL-CCNC: 15 U/L (ref 15–37)
BILIRUB SERPL-MCNC: 0.3 MG/DL (ref 0–1)
BUN SERPL-MCNC: 11 MG/DL (ref 7–20)
CALCIUM SERPL-MCNC: 9 MG/DL (ref 8.3–10.6)
CHLORIDE SERPL-SCNC: 106 MMOL/L (ref 99–110)
CO2 SERPL-SCNC: 18 MMOL/L (ref 21–32)
CREAT SERPL-MCNC: 0.6 MG/DL (ref 0.6–1.1)
GFR SERPLBLD CREATININE-BSD FMLA CKD-EPI: >60 ML/MIN/{1.73_M2}
GLUCOSE SERPL-MCNC: 84 MG/DL (ref 70–99)
POTASSIUM SERPL-SCNC: 4.1 MMOL/L (ref 3.5–5.1)
PROT SERPL-MCNC: 6.6 G/DL (ref 6.4–8.2)
SODIUM SERPL-SCNC: 141 MMOL/L (ref 136–145)

## 2023-05-17 NOTE — TELEPHONE ENCOUNTER
----- Message from Austin Sal MD sent at 5/17/2023  3:31 PM EDT -----  Please let patient know here CMP is within normal limits. Thanks.

## 2023-05-17 NOTE — TELEPHONE ENCOUNTER
Attempted to reach patient to relay [address] [facility],[address] message. VM full so unable to leave message.

## 2023-05-18 NOTE — TELEPHONE ENCOUNTER
PT returned call msg given. PT would like a return call. PT is concerned about her jaw pain which was caused from an infection by dental work. PT would like to know if this has anything to do with Cardiomyopathy. PT has been on medication for this 5 times. Is there any additional lab work the PT will need?

## 2023-05-18 NOTE — TELEPHONE ENCOUNTER
Please let patient know that jaw pain isn't related to her heart. No further cardiac testing indicated. I would follow up with PCP and dentist.  I'm sorry she's having jaw pain.

## 2023-05-23 ENCOUNTER — PATIENT MESSAGE (OUTPATIENT)
Dept: FAMILY MEDICINE CLINIC | Age: 33
End: 2023-05-23

## 2023-05-23 ENCOUNTER — TELEMEDICINE (OUTPATIENT)
Dept: FAMILY MEDICINE CLINIC | Age: 33
End: 2023-05-23
Payer: COMMERCIAL

## 2023-05-23 DIAGNOSIS — Z71.2 ENCOUNTER TO DISCUSS TEST RESULTS: Primary | ICD-10-CM

## 2023-05-23 PROBLEM — G43.909 MIGRAINES: Status: ACTIVE | Noted: 2023-05-23

## 2023-05-23 PROBLEM — B27.90 EPSTEIN BARR VIRUS INFECTION: Status: ACTIVE | Noted: 2018-10-19

## 2023-05-23 PROBLEM — Z87.820 HISTORY OF TRAUMATIC BRAIN INJURY: Status: ACTIVE | Noted: 2023-05-23

## 2023-05-23 PROCEDURE — 99213 OFFICE O/P EST LOW 20 MIN: CPT | Performed by: FAMILY MEDICINE

## 2023-05-23 PROCEDURE — G8427 DOCREV CUR MEDS BY ELIG CLIN: HCPCS | Performed by: FAMILY MEDICINE

## 2023-05-23 SDOH — ECONOMIC STABILITY: INCOME INSECURITY: HOW HARD IS IT FOR YOU TO PAY FOR THE VERY BASICS LIKE FOOD, HOUSING, MEDICAL CARE, AND HEATING?: NOT HARD AT ALL

## 2023-05-23 SDOH — ECONOMIC STABILITY: TRANSPORTATION INSECURITY
IN THE PAST 12 MONTHS, HAS LACK OF TRANSPORTATION KEPT YOU FROM MEETINGS, WORK, OR FROM GETTING THINGS NEEDED FOR DAILY LIVING?: NO

## 2023-05-23 SDOH — ECONOMIC STABILITY: FOOD INSECURITY: WITHIN THE PAST 12 MONTHS, THE FOOD YOU BOUGHT JUST DIDN'T LAST AND YOU DIDN'T HAVE MONEY TO GET MORE.: NEVER TRUE

## 2023-05-23 SDOH — ECONOMIC STABILITY: FOOD INSECURITY: WITHIN THE PAST 12 MONTHS, YOU WORRIED THAT YOUR FOOD WOULD RUN OUT BEFORE YOU GOT MONEY TO BUY MORE.: NEVER TRUE

## 2023-05-23 SDOH — ECONOMIC STABILITY: HOUSING INSECURITY
IN THE LAST 12 MONTHS, WAS THERE A TIME WHEN YOU DID NOT HAVE A STEADY PLACE TO SLEEP OR SLEPT IN A SHELTER (INCLUDING NOW)?: NO

## 2023-05-23 ASSESSMENT — PATIENT HEALTH QUESTIONNAIRE - PHQ9
2. FEELING DOWN, DEPRESSED OR HOPELESS: 0
SUM OF ALL RESPONSES TO PHQ QUESTIONS 1-9: 0
SUM OF ALL RESPONSES TO PHQ QUESTIONS 1-9: 0
SUM OF ALL RESPONSES TO PHQ9 QUESTIONS 1 & 2: 0
SUM OF ALL RESPONSES TO PHQ QUESTIONS 1-9: 0
1. LITTLE INTEREST OR PLEASURE IN DOING THINGS: 0
SUM OF ALL RESPONSES TO PHQ QUESTIONS 1-9: 0
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
## 2023-05-25 ENCOUNTER — TELEPHONE (OUTPATIENT)
Dept: CARDIOLOGY CLINIC | Age: 33
End: 2023-05-25

## 2023-05-25 DIAGNOSIS — G90.A POTS (POSTURAL ORTHOSTATIC TACHYCARDIA SYNDROME): ICD-10-CM

## 2023-05-25 RX ORDER — PROPRANOLOL HYDROCHLORIDE 120 MG/1
120 CAPSULE, EXTENDED RELEASE ORAL DAILY
Qty: 90 CAPSULE | Refills: 1 | OUTPATIENT
Start: 2023-05-25 | End: 2023-08-23

## 2023-05-25 RX ORDER — PROPRANOLOL HYDROCHLORIDE 120 MG/1
CAPSULE, EXTENDED RELEASE ORAL
Qty: 90 CAPSULE | Refills: 0 | Status: SHIPPED | OUTPATIENT
Start: 2023-05-25

## 2023-05-25 NOTE — TELEPHONE ENCOUNTER
Clinton Conner today at 1215pm. If you are trying to reschedule this appointment he unfortunately does not have any availability until September at this time without approval for potential overbook. If you cannot make today's appointment I will send a message to Clinton Conner with your appointment comment to see if there is anything else we can do. Please let us know if you are unable to make the appointment today. Thank you so much!   [address]

## 2023-08-20 DIAGNOSIS — G90.A POTS (POSTURAL ORTHOSTATIC TACHYCARDIA SYNDROME): ICD-10-CM

## 2023-08-21 RX ORDER — PROPRANOLOL HYDROCHLORIDE 120 MG/1
CAPSULE, EXTENDED RELEASE ORAL
Qty: 90 CAPSULE | Refills: 1 | Status: SHIPPED | OUTPATIENT
Start: 2023-08-21

## 2023-09-18 ENCOUNTER — PATIENT MESSAGE (OUTPATIENT)
Dept: CARDIOLOGY CLINIC | Age: 33
End: 2023-09-18

## 2023-11-16 ENCOUNTER — PATIENT MESSAGE (OUTPATIENT)
Dept: CARDIOLOGY CLINIC | Age: 33
End: 2023-11-16

## 2023-11-16 ENCOUNTER — OFFICE VISIT (OUTPATIENT)
Dept: CARDIOLOGY CLINIC | Age: 33
End: 2023-11-16
Payer: COMMERCIAL

## 2023-11-16 VITALS
SYSTOLIC BLOOD PRESSURE: 118 MMHG | DIASTOLIC BLOOD PRESSURE: 62 MMHG | BODY MASS INDEX: 25.52 KG/M2 | HEIGHT: 65 IN | OXYGEN SATURATION: 100 % | HEART RATE: 69 BPM | WEIGHT: 153.2 LBS

## 2023-11-16 DIAGNOSIS — G90.A POTS (POSTURAL ORTHOSTATIC TACHYCARDIA SYNDROME): Primary | ICD-10-CM

## 2023-11-16 PROCEDURE — G8484 FLU IMMUNIZE NO ADMIN: HCPCS | Performed by: INTERNAL MEDICINE

## 2023-11-16 PROCEDURE — G8419 CALC BMI OUT NRM PARAM NOF/U: HCPCS | Performed by: INTERNAL MEDICINE

## 2023-11-16 PROCEDURE — 99213 OFFICE O/P EST LOW 20 MIN: CPT | Performed by: INTERNAL MEDICINE

## 2023-11-16 PROCEDURE — G8427 DOCREV CUR MEDS BY ELIG CLIN: HCPCS | Performed by: INTERNAL MEDICINE

## 2023-11-16 PROCEDURE — 1036F TOBACCO NON-USER: CPT | Performed by: INTERNAL MEDICINE

## 2023-11-16 PROCEDURE — 93000 ELECTROCARDIOGRAM COMPLETE: CPT | Performed by: INTERNAL MEDICINE

## 2023-11-16 NOTE — PATIENT INSTRUCTIONS
RECOMMENDATIONS:  Rest and elevate leg when able.   Start naproxen 200 mg twice daily for two weeks then stop  Follow up with PCP as needed  Follow up with me in 1 year

## 2023-11-16 NOTE — PROGRESS NOTES
[facility]   Electrophysiology Note    Date: 11/16/23  Patient Name: Nury Arredondo  YOB: 1990    Primary Care Physician: Jayne Whitney DO    CHIEF COMPLAINT:   Chief Complaint   Patient presents with    1 Year Follow Up    Tachycardia     POTS        HISTORY OF PRESENT ILLNESS: Nury Arredondo is a 28 y.o. female who initally presented for follow up for POTS (2018). She has a PMH of low Vit B12 (that she gets B 12 injections), IBS, HTN,, and small fiber neuropathy. An Echocardiogram from 7/28/10 [facility]) demonstrated EF 55%. She stated she had a positive tilt table test.  She quit smoking 6 years ago. She does not use any illegal drugs and only drinks maybe twice a year. Echo on 6/10/2020 shows an EF of 55%. At her office visit on 9/3/20 she reported her SOB had improved but was still experiencing palpitations and dizziness with her anxiety. On 12/8/20, she reports she was doing well. She reported her propranolol every day in the morning and if she is having a lot of issues with her POTS that day and her HR, BP, and dizziness occur, she will take it again about 6 hours later. She felt that the propranolol has helped her POTS. She reported she had not had a migraine for 2 months. Dr Concetta Dakin at [address] [place] [address] is her neurologist. He wanted to double check with our office to see if increasing her propranolol would be an option. At 3/11/2021 office visit, she reported the propranolol has been helpful to her migraines. She reported that she continues to have no migraines, and no POTS symptoms other than perspiring occasionally. At the conclusion of 3/2021 office visit, patient's propranolol was increased. Today, 11/16/2023, EKG demonstrates SR HR 69. She reports she has a clot in the left leg for the last year. The leg swells occasionally and hurts. She states the swelling looks better since starting cinnamon. She also takes alberto shots to thin the blood more.  She has to prop up her
(Right, 2014); Nasal septum surgery (2018);  section (2019); Breast cyst excision (Right, 2017); Upper gastrointestinal endoscopy (N/A, 2020); and sinus surgery. Social History:   reports that she quit smoking about 9 years ago. Her smoking use included cigarettes. She has never used smokeless tobacco. She reports current alcohol use. She reports current drug use. Drug: Marijuana Lupis Banister). Family History: family history includes Breast Cancer in her maternal grandmother; Diabetes in her paternal grandfather; Heart Attack in her paternal grandfather; Hypertension in her paternal grandfather; No Known Problems in her father and mother. Allergies:  Patient has no known allergies. Home Medications:    Prior to Admission medications    Medication Sig Start Date End Date Taking? Authorizing Provider   propranolol (INDERAL LA) 120 MG extended release capsule TAKE 1 CAPSULE BY MOUTH EVERY DAY 23   Shruti Handley MD   cyanocobalamin 1000 MCG/ML injection INJECT 1 ML INTO THE MUSCLE EVERY 30 DAYS 22   Jose Daniel Stage, DO   propranolol (INDERAL) 40 MG tablet TAKE 1 TABLET BY MOUTH 2 TIMES DAILY AS NEEDED (PALPITATIONS) 22   Shruti Handley MD   valACYclovir (VALTREX) 500 MG tablet Take 1 tablet by mouth 2 times daily    ProviderKimo MD   Cranberry-Vitamin C 250-60 MG CAPS Take by mouth    Kimo Harman MD   Magnesium 80 MG TABS Take by mouth    Kimo Harman MD   albuterol sulfate  (90 Base) MCG/ACT inhaler Inhale 2 puffs into the lungs every 6 hours as needed 20   Kimo Harman MD   Syringe/Needle, Disp, (SYRINGE 3CC/25GX5/8\") 25G X 5\" 3 ML MISC Use as directed to administer B12 once a month 20   Jose Daniel Sosa, DO       REVIEW OF SYSTEMS:      All 14-point review of systems are completed and pertinent positives are mentioned in the history of present illness. Other systems are reviewed and are negative.

## 2023-11-18 RX ORDER — NAPROXEN 250 MG/1
250 TABLET ORAL 2 TIMES DAILY WITH MEALS
Qty: 28 TABLET | Refills: 0 | Status: SHIPPED | OUTPATIENT
Start: 2023-11-18

## 2023-11-18 RX ORDER — PROPRANOLOL HYDROCHLORIDE 120 MG/1
120 CAPSULE, EXTENDED RELEASE ORAL DAILY
Qty: 90 CAPSULE | Refills: 3 | Status: SHIPPED | OUTPATIENT
Start: 2023-11-18

## 2024-04-11 ENCOUNTER — PATIENT MESSAGE (OUTPATIENT)
Dept: CARDIOLOGY CLINIC | Age: 34
End: 2024-04-11

## 2024-04-12 NOTE — TELEPHONE ENCOUNTER
Spoke with PMKW and legally we are unable to share this paperwork. Information relayed to patient.

## 2024-04-12 NOTE — TELEPHONE ENCOUNTER
From: Dionna Rahman  To: Dr. NEEL Shayeh  Sent: 4/11/2024 5:28 PM EDT  Subject: [facility] medical questionnaire     Hello     Can I have a copy of the medical questionnaire filled out for the [facility] in January for my personal records?     Thank you,     Dionna

## 2024-06-11 DIAGNOSIS — G90.A POTS (POSTURAL ORTHOSTATIC TACHYCARDIA SYNDROME): ICD-10-CM

## 2024-06-13 RX ORDER — PROPRANOLOL HYDROCHLORIDE 120 MG/1
120 CAPSULE, EXTENDED RELEASE ORAL DAILY
Qty: 90 CAPSULE | Refills: 1 | Status: SHIPPED | OUTPATIENT
Start: 2024-06-13

## 2024-11-19 DIAGNOSIS — G90.A POTS (POSTURAL ORTHOSTATIC TACHYCARDIA SYNDROME): ICD-10-CM

## 2024-11-19 RX ORDER — PROPRANOLOL HYDROCHLORIDE 120 MG/1
120 CAPSULE, EXTENDED RELEASE ORAL DAILY
Qty: 90 CAPSULE | Refills: 0 | Status: SHIPPED | OUTPATIENT
Start: 2024-11-19

## 2024-11-19 NOTE — TELEPHONE ENCOUNTER
Last Office Visit: 11/16/2023 Provider: MARLEN  Is provider OOT? No    Next Office Visit: 2/6/2025 Provider: MARLEN  **If no OV, when does pt need to be seen? in 1 year(s)  **Has patient already had 30 day supply? No    LAST LABS:   EKG 11/2023        Requested Prescriptions     Pending Prescriptions Disp Refills    propranolol (INDERAL LA) 120 MG extended release capsule [Pharmacy Med Name: PROPRANOLOL HCL ER CAPS 120MG] 90 capsule 3     Sig: TAKE 1 CAPSULE DAILY

## 2025-02-05 ENCOUNTER — PATIENT MESSAGE (OUTPATIENT)
Dept: CARDIOLOGY CLINIC | Age: 35
End: 2025-02-05

## 2025-02-05 DIAGNOSIS — G90.A POTS (POSTURAL ORTHOSTATIC TACHYCARDIA SYNDROME): ICD-10-CM

## 2025-02-05 DIAGNOSIS — G90.A POTS (POSTURAL ORTHOSTATIC TACHYCARDIA SYNDROME): Primary | ICD-10-CM

## 2025-02-05 NOTE — TELEPHONE ENCOUNTER
Pt called requesting a refill for  propranolol (INDERAL LA) 120 MG extended release capsule pt will be out in 2 weeks    Preferred pharmacy  [facility] - [place], [place] [address] - P [phone] - F [phone]     Last ov 11/16/23 AGK  Next ov 5/15/52 AGK

## 2025-02-05 NOTE — TELEPHONE ENCOUNTER
Pt called back, explained reasoning for bloodwork and why we do it yearly. Pt v/u and agreed to it- pt requested we add on a lipid panel due not not wanting to have to get another lab drawn down the line      Contacting pts pcp- she stated she got labs done some time in 2024/end of 2023 and would like us to use them as comparison.     Okay to order lipid panel?

## 2025-02-06 RX ORDER — PROPRANOLOL HYDROCHLORIDE 120 MG/1
120 CAPSULE, EXTENDED RELEASE ORAL DAILY
Qty: 90 CAPSULE | Refills: 0 | Status: SHIPPED | OUTPATIENT
Start: 2025-02-06

## (undated) DEVICE — Z DISCONTINUED USE 2276105 GOWN PROTCT UNIV CHST W28IN L49IN SL 24IN BLU SPUNBOND FLM

## (undated) DEVICE — AIRLIFE™ NASAL OXYGEN CANNULA CURVED, NONFLARED TIP, WITH 7' FEET (2.1 M) CRUSH RESISTANT TUBING, OVER-THE-EAR STYLE: Brand: AIRLIFE™

## (undated) DEVICE — ELECTRODE ECG MONITR FOAM TEAR DROP ADLT RED

## (undated) DEVICE — CONMED SCOPE SAVER BITE BLOCK, 20X27 MM: Brand: SCOPE SAVER

## (undated) DEVICE — SINGLE-USE BIOPSY FORCEPS: Brand: RADIAL JAW 4